# Patient Record
Sex: FEMALE | Race: WHITE | NOT HISPANIC OR LATINO | Employment: FULL TIME | URBAN - METROPOLITAN AREA
[De-identification: names, ages, dates, MRNs, and addresses within clinical notes are randomized per-mention and may not be internally consistent; named-entity substitution may affect disease eponyms.]

---

## 2018-10-22 ENCOUNTER — OFFICE VISIT CONVERTED (OUTPATIENT)
Dept: ORTHOPEDIC SURGERY | Facility: CLINIC | Age: 38
End: 2018-10-22
Attending: PHYSICIAN ASSISTANT

## 2019-02-04 ENCOUNTER — HOSPITAL ENCOUNTER (OUTPATIENT)
Dept: GENERAL RADIOLOGY | Facility: HOSPITAL | Age: 39
Discharge: HOME | End: 2019-02-04
Attending: NURSE PRACTITIONER

## 2019-02-11 ENCOUNTER — HOSPITAL ENCOUNTER (OUTPATIENT)
Dept: OTHER | Facility: HOSPITAL | Age: 39
Discharge: HOME | End: 2019-02-11
Attending: OBSTETRICS & GYNECOLOGY

## 2019-03-01 ENCOUNTER — HOSPITAL ENCOUNTER (OUTPATIENT)
Dept: PERIOP | Facility: HOSPITAL | Age: 39
Setting detail: HOSPITAL OUTPATIENT SURGERY
Discharge: HOME | End: 2019-03-01
Attending: OBSTETRICS & GYNECOLOGY

## 2019-03-01 LAB — HCG UR QL: NEGATIVE

## 2019-04-15 ENCOUNTER — CONVERSION ENCOUNTER (OUTPATIENT)
Dept: FAMILY MEDICINE CLINIC | Facility: CLINIC | Age: 39
End: 2019-04-15

## 2019-04-15 ENCOUNTER — OFFICE VISIT CONVERTED (OUTPATIENT)
Dept: FAMILY MEDICINE CLINIC | Facility: CLINIC | Age: 39
End: 2019-04-15
Attending: NURSE PRACTITIONER

## 2019-07-15 ENCOUNTER — OFFICE VISIT CONVERTED (OUTPATIENT)
Dept: FAMILY MEDICINE CLINIC | Facility: CLINIC | Age: 39
End: 2019-07-15
Attending: NURSE PRACTITIONER

## 2019-07-15 ENCOUNTER — HOSPITAL ENCOUNTER (OUTPATIENT)
Dept: GENERAL RADIOLOGY | Facility: HOSPITAL | Age: 39
Discharge: HOME | End: 2019-07-15
Attending: NURSE PRACTITIONER

## 2019-07-15 ENCOUNTER — HOSPITAL ENCOUNTER (OUTPATIENT)
Dept: LAB | Facility: HOSPITAL | Age: 39
Discharge: HOME | End: 2019-07-15

## 2019-07-15 LAB
ALBUMIN SERPL-MCNC: 4 G/DL (ref 3.5–5)
ALBUMIN/GLOB SERPL: 1.2 {RATIO} (ref 1.4–2.6)
ALP SERPL-CCNC: 160 U/L (ref 42–98)
ALT SERPL-CCNC: 21 U/L (ref 10–40)
ANION GAP SERPL CALC-SCNC: 21 MMOL/L (ref 8–19)
AST SERPL-CCNC: 20 U/L (ref 15–50)
BASOPHILS # BLD AUTO: 0.04 10*3/UL (ref 0–0.2)
BASOPHILS NFR BLD AUTO: 0.4 % (ref 0–3)
BILIRUB SERPL-MCNC: 0.2 MG/DL (ref 0.2–1.3)
BUN SERPL-MCNC: 10 MG/DL (ref 5–25)
BUN/CREAT SERPL: 13 {RATIO} (ref 6–20)
CALCIUM SERPL-MCNC: 9 MG/DL (ref 8.7–10.4)
CHLORIDE SERPL-SCNC: 103 MMOL/L (ref 99–111)
CHOLEST SERPL-MCNC: 162 MG/DL (ref 107–200)
CHOLEST/HDLC SERPL: 3.5 {RATIO} (ref 3–6)
CONV ABS IMM GRAN: 0.04 10*3/UL (ref 0–0.2)
CONV CO2: 20 MMOL/L (ref 22–32)
CONV IMMATURE GRAN: 0.4 % (ref 0–1.8)
CONV TOTAL PROTEIN: 7.3 G/DL (ref 6.3–8.2)
CREAT UR-MCNC: 0.76 MG/DL (ref 0.5–0.9)
DEPRECATED RDW RBC AUTO: 49.7 FL (ref 36.4–46.3)
EOSINOPHIL # BLD AUTO: 0.23 10*3/UL (ref 0–0.7)
EOSINOPHIL # BLD AUTO: 2.6 % (ref 0–7)
ERYTHROCYTE [DISTWIDTH] IN BLOOD BY AUTOMATED COUNT: 16.7 % (ref 11.7–14.4)
GFR SERPLBLD BASED ON 1.73 SQ M-ARVRAT: >60 ML/MIN/{1.73_M2}
GLOBULIN UR ELPH-MCNC: 3.3 G/DL (ref 2–3.5)
GLUCOSE SERPL-MCNC: 101 MG/DL (ref 65–99)
HBA1C MFR BLD: 12.2 G/DL (ref 12–16)
HCT VFR BLD AUTO: 39.9 % (ref 37–47)
HDLC SERPL-MCNC: 46 MG/DL (ref 40–60)
LDLC SERPL CALC-MCNC: 97 MG/DL (ref 70–100)
LYMPHOCYTES # BLD AUTO: 1.5 10*3/UL (ref 1–5)
MCH RBC QN AUTO: 25.1 PG (ref 27–31)
MCHC RBC AUTO-ENTMCNC: 30.6 G/DL (ref 33–37)
MCV RBC AUTO: 81.9 FL (ref 81–99)
MONOCYTES # BLD AUTO: 0.6 10*3/UL (ref 0.2–1.2)
MONOCYTES NFR BLD AUTO: 6.7 % (ref 3–10)
NEUTROPHILS # BLD AUTO: 6.5 10*3/UL (ref 2–8)
NEUTROPHILS NFR BLD AUTO: 73.1 % (ref 30–85)
NRBC CBCN: 0 % (ref 0–0.7)
OSMOLALITY SERPL CALC.SUM OF ELEC: 287 MOSM/KG (ref 273–304)
PLATELET # BLD AUTO: 363 10*3/UL (ref 130–400)
PMV BLD AUTO: 9.7 FL (ref 9.4–12.3)
POTASSIUM SERPL-SCNC: 4.6 MMOL/L (ref 3.5–5.3)
RBC # BLD AUTO: 4.87 10*6/UL (ref 4.2–5.4)
SODIUM SERPL-SCNC: 139 MMOL/L (ref 135–147)
TRIGL SERPL-MCNC: 95 MG/DL (ref 40–150)
TSH SERPL-ACNC: 3.01 M[IU]/L (ref 0.27–4.2)
VARIANT LYMPHS NFR BLD MANUAL: 16.8 % (ref 20–45)
VLDLC SERPL-MCNC: 19 MG/DL (ref 5–37)
WBC # BLD AUTO: 8.91 10*3/UL (ref 4.8–10.8)

## 2019-07-16 LAB
EST. AVERAGE GLUCOSE BLD GHB EST-MCNC: 126 MG/DL
HBA1C MFR BLD: 6 % (ref 3.5–5.7)

## 2019-07-30 ENCOUNTER — HOSPITAL ENCOUNTER (OUTPATIENT)
Dept: LAB | Facility: HOSPITAL | Age: 39
Discharge: HOME | End: 2019-07-30
Attending: NURSE PRACTITIONER

## 2019-07-30 ENCOUNTER — CONVERSION ENCOUNTER (OUTPATIENT)
Dept: FAMILY MEDICINE CLINIC | Facility: CLINIC | Age: 39
End: 2019-07-30

## 2019-07-30 LAB — GGT SERPL-CCNC: 30 U/L (ref 7–70)

## 2019-09-09 ENCOUNTER — HOSPITAL ENCOUNTER (OUTPATIENT)
Dept: LAB | Facility: HOSPITAL | Age: 39
Discharge: HOME | End: 2019-09-09

## 2019-09-16 ENCOUNTER — HOSPITAL ENCOUNTER (OUTPATIENT)
Dept: FAMILY MEDICINE CLINIC | Facility: CLINIC | Age: 39
Discharge: HOME | End: 2019-09-16
Attending: NURSE PRACTITIONER

## 2019-09-16 ENCOUNTER — OFFICE VISIT CONVERTED (OUTPATIENT)
Dept: FAMILY MEDICINE CLINIC | Facility: CLINIC | Age: 39
End: 2019-09-16
Attending: NURSE PRACTITIONER

## 2019-09-18 LAB — BACTERIA UR CULT: NORMAL

## 2019-10-14 ENCOUNTER — OFFICE VISIT CONVERTED (OUTPATIENT)
Dept: FAMILY MEDICINE CLINIC | Facility: CLINIC | Age: 39
End: 2019-10-14
Attending: NURSE PRACTITIONER

## 2019-11-08 ENCOUNTER — OFFICE VISIT CONVERTED (OUTPATIENT)
Dept: OTOLARYNGOLOGY | Facility: CLINIC | Age: 39
End: 2019-11-08
Attending: OTOLARYNGOLOGY

## 2019-11-08 ENCOUNTER — CONVERSION ENCOUNTER (OUTPATIENT)
Dept: OTHER | Facility: HOSPITAL | Age: 39
End: 2019-11-08

## 2019-11-08 ENCOUNTER — HOSPITAL ENCOUNTER (OUTPATIENT)
Dept: OTOLARYNGOLOGY | Facility: HOSPITAL | Age: 39
Discharge: HOME | End: 2019-11-08
Attending: OTOLARYNGOLOGY

## 2020-07-24 ENCOUNTER — HOSPITAL ENCOUNTER (OUTPATIENT)
Dept: LAB | Facility: HOSPITAL | Age: 40
Discharge: HOME | End: 2020-07-24

## 2020-07-24 LAB
ALBUMIN SERPL-MCNC: 4 G/DL (ref 3.5–5)
ALBUMIN/GLOB SERPL: 1.3 {RATIO} (ref 1.4–2.6)
ALP SERPL-CCNC: 128 U/L (ref 42–98)
ALT SERPL-CCNC: 21 U/L (ref 10–40)
ANION GAP SERPL CALC-SCNC: 15 MMOL/L (ref 8–19)
AST SERPL-CCNC: 18 U/L (ref 15–50)
BASOPHILS # BLD AUTO: 0.03 10*3/UL (ref 0–0.2)
BASOPHILS NFR BLD AUTO: 0.4 % (ref 0–3)
BILIRUB SERPL-MCNC: 0.22 MG/DL (ref 0.2–1.3)
BUN SERPL-MCNC: 13 MG/DL (ref 5–25)
BUN/CREAT SERPL: 18 {RATIO} (ref 6–20)
CALCIUM SERPL-MCNC: 9.7 MG/DL (ref 8.7–10.4)
CHLORIDE SERPL-SCNC: 102 MMOL/L (ref 99–111)
CHOLEST SERPL-MCNC: 178 MG/DL (ref 107–200)
CHOLEST/HDLC SERPL: 3.7 {RATIO} (ref 3–6)
CONV ABS IMM GRAN: 0.02 10*3/UL (ref 0–0.2)
CONV CO2: 25 MMOL/L (ref 22–32)
CONV IMMATURE GRAN: 0.3 % (ref 0–1.8)
CONV TOTAL PROTEIN: 7.1 G/DL (ref 6.3–8.2)
CREAT UR-MCNC: 0.73 MG/DL (ref 0.5–0.9)
DEPRECATED RDW RBC AUTO: 45.1 FL (ref 36.4–46.3)
EOSINOPHIL # BLD AUTO: 0.3 10*3/UL (ref 0–0.7)
EOSINOPHIL # BLD AUTO: 4 % (ref 0–7)
ERYTHROCYTE [DISTWIDTH] IN BLOOD BY AUTOMATED COUNT: 13.6 % (ref 11.7–14.4)
EST. AVERAGE GLUCOSE BLD GHB EST-MCNC: 120 MG/DL
GFR SERPLBLD BASED ON 1.73 SQ M-ARVRAT: >60 ML/MIN/{1.73_M2}
GLOBULIN UR ELPH-MCNC: 3.1 G/DL (ref 2–3.5)
GLUCOSE SERPL-MCNC: 111 MG/DL (ref 65–99)
HBA1C MFR BLD: 5.8 % (ref 3.5–5.7)
HCT VFR BLD AUTO: 45.1 % (ref 37–47)
HDLC SERPL-MCNC: 48 MG/DL (ref 40–60)
HGB BLD-MCNC: 14 G/DL (ref 12–16)
LDLC SERPL CALC-MCNC: 112 MG/DL (ref 70–100)
LYMPHOCYTES # BLD AUTO: 1.61 10*3/UL (ref 1–5)
LYMPHOCYTES NFR BLD AUTO: 21.4 % (ref 20–45)
MCH RBC QN AUTO: 28.2 PG (ref 27–31)
MCHC RBC AUTO-ENTMCNC: 31 G/DL (ref 33–37)
MCV RBC AUTO: 90.7 FL (ref 81–99)
MONOCYTES # BLD AUTO: 0.46 10*3/UL (ref 0.2–1.2)
MONOCYTES NFR BLD AUTO: 6.1 % (ref 3–10)
NEUTROPHILS # BLD AUTO: 5.1 10*3/UL (ref 2–8)
NEUTROPHILS NFR BLD AUTO: 67.8 % (ref 30–85)
NRBC CBCN: 0 % (ref 0–0.7)
OSMOLALITY SERPL CALC.SUM OF ELEC: 287 MOSM/KG (ref 273–304)
PLATELET # BLD AUTO: 306 10*3/UL (ref 130–400)
PMV BLD AUTO: 9.4 FL (ref 9.4–12.3)
POTASSIUM SERPL-SCNC: 4.1 MMOL/L (ref 3.5–5.3)
RBC # BLD AUTO: 4.97 10*6/UL (ref 4.2–5.4)
SODIUM SERPL-SCNC: 138 MMOL/L (ref 135–147)
TRIGL SERPL-MCNC: 89 MG/DL (ref 40–150)
TSH SERPL-ACNC: 2.98 M[IU]/L (ref 0.27–4.2)
VLDLC SERPL-MCNC: 18 MG/DL (ref 5–37)
WBC # BLD AUTO: 7.52 10*3/UL (ref 4.8–10.8)

## 2020-07-27 ENCOUNTER — OFFICE VISIT CONVERTED (OUTPATIENT)
Dept: FAMILY MEDICINE CLINIC | Facility: CLINIC | Age: 40
End: 2020-07-27
Attending: NURSE PRACTITIONER

## 2020-07-27 ENCOUNTER — CONVERSION ENCOUNTER (OUTPATIENT)
Dept: FAMILY MEDICINE CLINIC | Facility: CLINIC | Age: 40
End: 2020-07-27

## 2021-04-12 ENCOUNTER — HOSPITAL ENCOUNTER (OUTPATIENT)
Dept: LAB | Facility: HOSPITAL | Age: 41
Discharge: HOME | End: 2021-04-12
Attending: NURSE PRACTITIONER

## 2021-04-12 ENCOUNTER — OFFICE VISIT CONVERTED (OUTPATIENT)
Dept: FAMILY MEDICINE CLINIC | Facility: CLINIC | Age: 41
End: 2021-04-12
Attending: NURSE PRACTITIONER

## 2021-04-12 LAB
BASOPHILS # BLD AUTO: 0.04 10*3/UL (ref 0–0.2)
BASOPHILS NFR BLD AUTO: 0.4 % (ref 0–3)
CONV ABS IMM GRAN: 0.03 10*3/UL (ref 0–0.2)
CONV IMMATURE GRAN: 0.3 % (ref 0–1.8)
DEPRECATED RDW RBC AUTO: 45.1 FL (ref 36.4–46.3)
EOSINOPHIL # BLD AUTO: 0.36 10*3/UL (ref 0–0.7)
EOSINOPHIL # BLD AUTO: 3.7 % (ref 0–7)
ERYTHROCYTE [DISTWIDTH] IN BLOOD BY AUTOMATED COUNT: 13.9 % (ref 11.7–14.4)
HCT VFR BLD AUTO: 41.6 % (ref 37–47)
HGB BLD-MCNC: 13.4 G/DL (ref 12–16)
LYMPHOCYTES # BLD AUTO: 1.94 10*3/UL (ref 1–5)
LYMPHOCYTES NFR BLD AUTO: 20.1 % (ref 20–45)
MCH RBC QN AUTO: 28.5 PG (ref 27–31)
MCHC RBC AUTO-ENTMCNC: 32.2 G/DL (ref 33–37)
MCV RBC AUTO: 88.5 FL (ref 81–99)
MONOCYTES # BLD AUTO: 0.7 10*3/UL (ref 0.2–1.2)
MONOCYTES NFR BLD AUTO: 7.3 % (ref 3–10)
NEUTROPHILS # BLD AUTO: 6.58 10*3/UL (ref 2–8)
NEUTROPHILS NFR BLD AUTO: 68.2 % (ref 30–85)
NRBC CBCN: 0 % (ref 0–0.7)
PLATELET # BLD AUTO: 323 10*3/UL (ref 130–400)
PMV BLD AUTO: 9.8 FL (ref 9.4–12.3)
RBC # BLD AUTO: 4.7 10*6/UL (ref 4.2–5.4)
WBC # BLD AUTO: 9.65 10*3/UL (ref 4.8–10.8)

## 2021-04-13 LAB
ALBUMIN SERPL-MCNC: 3.8 G/DL (ref 3.5–5)
ALBUMIN/GLOB SERPL: 1.2 {RATIO} (ref 1.4–2.6)
ALP SERPL-CCNC: 121 U/L (ref 42–98)
ALT SERPL-CCNC: 20 U/L (ref 10–40)
ANION GAP SERPL CALC-SCNC: 15 MMOL/L (ref 8–19)
ASO AB SERPL-ACNC: 76 [IU]/ML (ref 0–200)
AST SERPL-CCNC: 19 U/L (ref 15–50)
BILIRUB SERPL-MCNC: <0.15 MG/DL (ref 0.2–1.3)
BUN SERPL-MCNC: 8 MG/DL (ref 5–25)
BUN/CREAT SERPL: 12 {RATIO} (ref 6–20)
CALCIUM SERPL-MCNC: 8.6 MG/DL (ref 8.7–10.4)
CHLORIDE SERPL-SCNC: 104 MMOL/L (ref 99–111)
CONV CO2: 22 MMOL/L (ref 22–32)
CONV RHEUMATOID FACTOR IGM: <10 [IU]/ML (ref 0–14)
CONV TOTAL PROTEIN: 7 G/DL (ref 6.3–8.2)
CREAT UR-MCNC: 0.67 MG/DL (ref 0.5–0.9)
CRP SERPL-MCNC: 12.1 MG/L (ref 0–5)
DSDNA AB SER-ACNC: NEGATIVE [IU]/ML
ENA AB SER IA-ACNC: NEGATIVE {RATIO}
ERYTHROCYTE [SEDIMENTATION RATE] IN BLOOD: 20 MM/H (ref 0–20)
GFR SERPLBLD BASED ON 1.73 SQ M-ARVRAT: >60 ML/MIN/{1.73_M2}
GLOBULIN UR ELPH-MCNC: 3.2 G/DL (ref 2–3.5)
GLUCOSE SERPL-MCNC: 90 MG/DL (ref 65–99)
OSMOLALITY SERPL CALC.SUM OF ELEC: 282 MOSM/KG (ref 273–304)
PHOSPHATE SERPL-MCNC: 3.9 MG/DL (ref 2.4–4.5)
POTASSIUM SERPL-SCNC: 4.2 MMOL/L (ref 3.5–5.3)
SODIUM SERPL-SCNC: 137 MMOL/L (ref 135–147)
URATE SERPL-MCNC: 4 MG/DL (ref 2.5–7.5)

## 2021-04-14 ENCOUNTER — OFFICE VISIT CONVERTED (OUTPATIENT)
Dept: CARDIOLOGY | Facility: CLINIC | Age: 41
End: 2021-04-14
Attending: INTERNAL MEDICINE

## 2021-04-22 ENCOUNTER — HOSPITAL ENCOUNTER (OUTPATIENT)
Dept: CARDIOLOGY | Facility: HOSPITAL | Age: 41
Discharge: HOME | End: 2021-04-22
Attending: INTERNAL MEDICINE

## 2021-05-11 NOTE — H&P
History and Physical      Patient Name: Catherine Reyes   Patient ID: [id number]   Sex: Female   YOB: 1980    Primary Care Provider: Ascencion SANCHEZ    Visit Date: April 14, 2021    Provider: Damion Navarro MD   Location: [facility]   Location Address: [address] [place] [address]  [address]  [place], [place]  [id number]   Location Phone: [phone]          Chief Complaint     Generalized fatigue.       History Of Present Illness  Consult requested by: Ascencion SANCHEZ   Catherine Reyes is a 40 year old /White female who was referred here by Ascencion Tay due to mild generalized fatigue and a strong family history of premature coronary artery disease. She does not report any episode of chest pain/pressure but does have mild chronic exertional dyspnea, which has been stable over the past year. She is attempting to lose some weight and is interested in being started on medication to help with that goal. Ms. Reyes is a former smoker and previously smoked 1 to 1-1/2 packs of cigarettes daily for approximately 25 years before quitting smoking about 2 years ago. She does not report any history of known hypertension, hyperlipidemia, or diabetes mellitus. However, she does have a strong family history of premature coronary artery disease and her brother had a heart attack at age 48 last year. She is significantly overweight with a BMI of 48.3 today. Ms. Reyes does not report any episodes of palpitations, orthopnea, PND, lightheadedness, or syncope. She has chronic very mild bilateral lower extremity edema, which is stable and unchanged. She does not report any reduction in her exercise tolerance and she remains moderately physically active. She has experienced some bluish discoloration of her feet, particularly after walking on them for most of the day. Her PCP has ordered an ROSALIA test to assess for arterial insufficiency, which has not yet been completed. Ms. Reyes does not report any  symptoms of intermittent claudication or any lower extremity wounds/ulcers. She has never been tested for venous insufficiency.   PAST MEDICAL HISTORY: Significant for nephrolithiasis, chronic left knee pain, migraine headaches, and mild bilateral ankle swelling. Past surgical history includes  section, cholecystectomy, and ablation.   PSYCHOSOCIAL HISTORY: She previously smoked 1 to 1-1/2 packs of cigarettes daily for approximately 25 years but quit smoking 2-3 years ago. She does not report any history of alcohol abuse or illicit drug use. She is  and lives with her .   FAMILY HISTORY: Significant for premature coronary artery disease in her brother and a grandfather. Also positive for hypertension in multiple family members and diabetes.   CURRENT MEDICATIONS: None.   ALLERGIES: Codeine.       Review of Systems  · Constitutional  o Admits  o : fatigue, good general health lately  o Denies  o : recent weight changes   · Eyes  o Admits  o : double vision  · HENT  o Admits  o : Sinus congestion and rhinorrhea  o Denies  o : hearing loss or ringing, swollen glands in neck  · Cardiovascular  o Admits  o : swelling (feet, ankles, hands)  o Denies  o : chest pain, palpitations (fast, fluttering, or skipping beats), shortness of breath while walking or lying flat  · Respiratory  o Admits  o : Occasional nonproductive cough  o Denies  o : asthma or wheezing, COPD  · Gastrointestinal  o Denies  o : ulcers, nausea or vomiting  · Neurologic  o Admits  o : headaches  o Denies  o : lightheaded or dizzy, stroke  · Musculoskeletal  o Admits  o : joint pain, back pain  · Endocrine  o Denies  o : thyroid disease, diabetes, heat or cold intolerance, excessive thirst or urination  · Heme-Lymph  o Admits  o : bleeding or bruising tendency  o Denies  o : anemia      Vitals  Date Time BP Position Site L\R Cuff Size HR RR TEMP (F) WT  HT  BMI kg/m2 BSA m2 O2 Sat FR L/min FiO2 HC       2021 11:38 /80  "Sitting    66 - R   299lbs 0oz 5'  6\" 48.26 2.51             Physical Examination  · Constitutional  o Appearance  o : Well developed, well nourished, alert and oriented, in no acute distress, appears stated age.  · Head and Face  o HEENT  o : Atraumatic. Pupils equal, round and reactive to light. No scleral icterus. Normal conjunctivae. Mucous membranes are moist.   · Neck  o Inspection/Palpation  o : Supple, no masses or thyromegaly.  o Jugular Veins  o : No JVD. No carotid bruits.  · Respiratory  o Auscultation of Lungs  o : Lungs clear to auscultation bilaterally. No wheezing, rales, or rhonchi. Mildly prolonged expiratory phase. No tachypnea. Normal effort with no increased work of breathing. No dullness.   · Cardiovascular  o Heart  o : Regular rate and rhythm, normal S1 and S2, no S3 or S4 gallop appreciated, no murmur, no heave.   · Gastrointestinal  o Abdominal Examination  o : Soft, obese, nondistended, nontender, normoactive bowel sounds in all quadrants, no masses, no hepatomegaly.   · Musculoskeletal  o General  o : Normal muscle tone and strength.  · Skin and Subcutaneous Tissue  o General Inspection  o : Warm and dry, no rashes or lesions, no jaundice, normal skin turgor.   · Neurologic  o Neurologic/Reflexes  o : Normal speech. Cranial nerves II-XII grossly intact. No focal motor deficits.   · Extremities  o Extremities  o : No cyanosis or clubbing. Trace bilateral lower extremity edema. There are 2+ radial pulses bilaterally.   · EKG  o EKG  o : Performed in the office today.   o Indications  o : Fatigue.  o Results  o : Sinus rhythm with heart rate of 63 beats per minute, normal EKG.   o Comparison  o : No prior EKG available for comparison.   · Labs  o Labs  o : Labs done on 04/12/2021: CBC: White blood cells 9.65, hemoglobin 13.4, hematocrit 41.6, platelets 323. Chemistry: Sodium 137, potassium 4.2, chloride 104, bicarb 22, BUN 8, creatinine 0.67, glucose 90. Liver function tests were within " normal limits. Fasting lipid panel from 07/24/2020: Total cholesterol 178, triglycerides 89, HDL 48, .           Assessment     1.  Mild generalized fatigue and mild chronic exertional dyspnea, likely multifactorial in nature.  However, she        does have several cardiovascular risk factors and would benefit from cardiovascular risk stratification.   2.  Morbid obesity with a BMI of 48.3 today.    3.  Former longstanding tobacco abuse.   4.  Intermittent bilateral foot discoloration, as detailed above.  Her PCP has already ordered an ROSALIA and I will        await the results of that study before proceeding with further workup.   5.  Borderline hypertension per today's BP reading.  She does not report any previous history of hypertension.          Plan     1.  Will obtain a treadmill stress test for cardiovascular risk stratification and further evaluation of her relatively        nonspecific symptoms.    2.  Will also obtain an echocardiogram to rule out structural heart disease.   3.  Her LDL goal would be less than 100 given her 10-year Seville risk and her last fasting lipid panel in        2020 showed a LDL of 112.  I recommended some dietary changes to improve her lipid status as well as a        repeat fasting lipid profile sometime in the next couple months.  If her LDL level remains significantly above        100, we could consider statin therapy but will defer this for now.    4.  She may benefit from further pulmonary evaluation if her cardiac testing is unremarkable.    5.  Significant weight loss was strongly recommended and we reviewed some dietary and exercise strategies        to help assist with this goal.  She previously tried Saxenda for weight loss but had trouble tolerating the        medication due to recurrent nausea.   6.  I will plan to see her back in the office in 2 months once we have the results of the aforementioned tests.     Damion Navarro MD  BP/rt                 Electronically Signed by: Kay Scott-, Other -Author on April 20, 2021 03:32:24 PM  Electronically Co-signed by: Damion Navarro MD -Reviewer on May 5, 2021 10:02:30 AM

## 2021-05-13 NOTE — PROGRESS NOTES
Progress Note      Patient Name: Catherine Reyes   Patient ID: [id number]   Sex: Female   YOB: 1980    Primary Care Provider: Ascencion SANCHEZ    Visit Date: 2020    Provider: DANIEL Metzger   Location: [facility]   Location Address: [address] [place] [address], [address] [place] [address]  [id number]   Location Phone: [phone]          Chief Complaint     [facility]       History Of Present Illness  Catherine Reyes is a 40 year old /White female who presents for evaluation and treatment of:      Here for [facility] physical.  Doing well.  Reviewed labs with patient.             Past Medical History  Disease Name Date Onset Notes   Ankle swelling --  --    Kidney stones --  --    Left knee pain --  --    Migraines --  --          Past Surgical History  Procedure Name Date Notes   Ablation --  --     --  --    Cholecystecomy --  --          Allergy List  Allergen Name Date Reaction Notes   Codeine Phosphate --  --  --    Codeine Sulfate --  --  --          Family Medical History  Disease Name Relative/Age Notes   Heart Disease  --    Hypertension  --    Diabetes  --          Social History  Finding Status Start/Stop Quantity Notes   Alcohol Never --/-- --  --    Tobacco Former --/-- < 0.5 ppd 15 years         Immunizations  NameDate Admin Mfg Trade Name Lot Number Route Inj VIS Given VIS Publication   [id number]/2019 SKB Fluarix, quadrivalent, preservative free 2A2KX NE NE 2020    Comments:    Gylfdozac48/2018 SKB Fluarix, quadrivalent, preservative free 2A2KX NE NE 07/15/2019    Comments:          Review of Systems  · Constitutional  o Denies  o : fatigue, night sweats  · Eyes  o Denies  o : double vision, blurred vision  · HENT  o Denies  o : vertigo, recent head injury  · Breasts  o Denies  o : abnormal changes in breast size, additional breast symptoms except as noted in the HPI  · Cardiovascular  o Denies  o : chest pain, irregular heart  "beats  · Respiratory  o Denies  o : shortness of breath, productive cough  · Gastrointestinal  o Denies  o : nausea, vomiting  · Genitourinary  o Denies  o : dysuria, urinary retention  · Integument  o Denies  o : hair growth change, new skin lesions  · Neurologic  o Denies  o : altered mental status, seizures  · Musculoskeletal  o Denies  o : joint swelling, limitation of motion  · Endocrine  o Denies  o : cold intolerance, heat intolerance  · Heme-Lymph  o Denies  o : petechiae, lymph node enlargement or tenderness  · Allergic-Immunologic  o Denies  o : frequent illnesses      Vitals  Date Time BP Position Site L\R Cuff Size HR RR TEMP (F) WT  HT  BMI kg/m2 BSA m2 O2 Sat HC       07/27/2020 03:03 /82 Sitting    84 - R 18 98.7 288lbs 0oz 5'  6\" 46.48 2.47 98 %          Physical Examination  · Constitutional  o Appearance  o : well-nourished, well developed, alert, in no acute distress  · Eyes  o Conjunctivae  o : conjunctivae normal  o Sclerae  o : sclerae white  o Pupils and Irises  o : pupils equal, round, and reactive to light and accommodation bilaterally  o Corneas  o : tear film normal, no lesions present  o Eyelids/Ocular Adnexae  o : eyelid appearance normal, no exudates present, eye moisture level normal  · Respiratory  o Respiratory Effort  o : breathing unlabored  o Inspection of Chest  o : normal appearance, no retractions  o Auscultation of Lungs  o : normal breath sounds throughout  · Cardiovascular  o Heart  o :   § Auscultation of Heart  § : regular rate and rhythm without murmur, PMI normal  o Peripheral Vascular System  o :   § Extremities  § : no cyanosis, clubbing or edema; less than 2 second refill noted  · Musculoskeletal  o General  o : No joint swelling or deformity noted. Muscle tone, strength and development grossly normal.  · Skin and Subcutaneous Tissue  o General Inspection  o : no rashes or lesions present, no areas of discoloration  · Neurologic  o Mental Status Examination  o : " judgement, insight intact, modd and affect appropriate  o Motor Examination  o : strength grossly intact in all four extremities  o Gait and Station  o : normal gait, able to stand without difficulty          Assessment  · Annual physical exam     V70.0/Z00.00    Problems Reconciled  Plan  · Orders  o ACO-39: Current medications updated and reviewed () - - 07/27/2020  o ACO-14: Influenza immunization administered or previously received () - - 07/27/2020  · Medications  o phentermine 37.5 mg oral tablet   SIG: take 1 tablet (37.5 mg) by oral route once daily before breakfast for 30 days   DISP: (30) tablets with 0 refills  Discontinued on 07/27/2020     o Medications have been Reconciled  o Transition of Care or Provider Policy  · Instructions  o Reviewed health maintenance flowsheet and updated information. Orders were placed and/or patient's response was documented.  o Patient was educated/instructed on their diagnosis, treatment and medications prior to discharge from the clinic today.  o Minutes spent with patient including greater than 50% in Education/Counseling/Care Coordination.  o Time spent with the patient was minutes, more than 50% face to face.  · Disposition  o Return as needed            Electronically Signed by: DANIEL Metzger -Author on July 27, 2020 03:14:37 PM

## 2021-05-14 VITALS
DIASTOLIC BLOOD PRESSURE: 84 MMHG | HEIGHT: 66 IN | WEIGHT: 293 LBS | BODY MASS INDEX: 47.09 KG/M2 | RESPIRATION RATE: 18 BRPM | HEART RATE: 83 BPM | SYSTOLIC BLOOD PRESSURE: 132 MMHG | TEMPERATURE: 97.8 F | OXYGEN SATURATION: 97 %

## 2021-05-14 VITALS
DIASTOLIC BLOOD PRESSURE: 80 MMHG | WEIGHT: 293 LBS | HEART RATE: 66 BPM | HEIGHT: 66 IN | SYSTOLIC BLOOD PRESSURE: 138 MMHG | BODY MASS INDEX: 47.09 KG/M2

## 2021-05-14 NOTE — PROGRESS NOTES
Progress Note      Patient Name: Catherine Reyes   Patient ID: [id number]   Sex: Female   YOB: 1980    Primary Care Provider: Ascencion SANCHEZ    Visit Date: 2021    Provider: DANIEL Metzger   Location: [facility]   Location Address: [address] [place] [address], [address] [place] [address]  [id number]   Location Phone: [phone]          Chief Complaint     The patient is here for joint pain and weight gain.       History Of Present Illness  Catherine Reyes is a 40 year old /White female who presents for evaluation and treatment of:      Pt c/o pain in her knees, ankles, and shoulders intermittently for a couple of months. She will have constant pain in a joint and then it will resolve spontaneously. She is concerned that this is related to her weight. She would like to start medication for weight loss. She has taken phentermine in the past with no problems but states when she stops taking it her energy crashes. She has also tried Belviq and Saxenda but had nausea when she took Saxenda so she stopped it.     She also c/o her feet turning a purple/blue color at the end of the day, especially after being on her feet all day. She denies SOA or chest pain. She also has had some numbness in her hands.       Past Medical History  Disease Name Date Onset Notes   Ankle swelling --  --    Kidney Stones --  --    Left knee pain --  --    Migraines --  --          Past Surgical History  Procedure Name Date Notes   Ablation --  --     --  --    Cholecystecomy --  --          Allergy List  Allergen Name Date Reaction Notes   Codeine Phosphate --  --  --    Codeine Sulfate --  --  --        Allergies Reconciled  Family Medical History  Disease Name Relative/Age Notes   Heart Disease  --    Hypertension  --    Diabetes  --          Social History  Finding Status Start/Stop Quantity Notes   Alcohol Never --/-- --  --    Tobacco Former --/-- < 0.5 ppd 15 years  "        Immunizations  NameDate Admin Mfg Trade Name Lot Number Route Inj VIS Given VIS Publication   COVID Julio César&John04/09/2021 NE Not Entered  NE NE 04/12/2021    Comments:    Veehkfjtx61/01/2020 SKB Fluarix, quadrivalent, preservative free 2A2KX NE NE 04/12/2021    Comments:          Review of Systems  · Constitutional  o Admits  o : weight gain  o Denies  o : fever, fatigue, weight loss  · Cardiovascular  o Denies  o : lower extremity edema, claudication, chest pressure, palpitations  · Respiratory  o Denies  o : shortness of breath, wheezing, cough, hemoptysis, dyspnea on exertion  · Gastrointestinal  o Denies  o : nausea, vomiting, diarrhea, constipation, abdominal pain  · Musculoskeletal  o Denies  o : joint pain      Vitals  Date Time BP Position Site L\R Cuff Size HR RR TEMP (F) WT  HT  BMI kg/m2 BSA m2 O2 Sat FR L/min FiO2        04/12/2021 02:45 /84 Sitting    83 - R 18 97.8 301lbs 0oz 5'  6\" 48.58 2.52 97 %  21%          Physical Examination  · Constitutional  o Appearance  o : well-nourished, well developed, alert, in no acute distress  · Eyes  o Conjunctivae  o : conjunctivae normal  o Sclerae  o : sclerae white  o Pupils and Irises  o : pupils equal, round, and reactive to light and accommodation bilaterally  o Corneas  o : tear film normal, no lesions present  o Eyelids/Ocular Adnexae  o : eyelid appearance normal, no exudates present, eye moisture level normal  · Ears, Nose, Mouth and Throat  o Throat  o : no erythemia, exudate or lesions  · Neck  o Inspection/Palpation  o : normal appearance, no masses or tenderness, trachea midline, no enlarged cervical or supraclavicular lymphnodes palpated  o Thyroid  o : gland size normal, nontender, no nodules or masses present on palpation  · Respiratory  o Respiratory Effort  o : breathing unlabored  o Inspection of Chest  o : normal appearance, no retractions  o Auscultation of Lungs  o : normal breath sounds " throughout  · Cardiovascular  o Heart  o :   § Auscultation of Heart  § : regular rate and rhythm without murmur, PMI normal  o Peripheral Vascular System  o :   § Carotid Arteries  § : normal pulses bilaterally, no bruits present  § Pedal Pulses  § : pulses 2 bilaterally  § Extremities  § : no cyanosis, clubbing or edema; less than 2 second refill noted  · Musculoskeletal  o General  o : No joint swelling or deformity noted. Muscle tone, strength and development grossly normal.  · Skin and Subcutaneous Tissue  o General Inspection  o : no rashes or lesions present, no areas of discoloration  · Neurologic  o Mental Status Examination  o : judgement, insight intact, modd and affect appropriate  o Motor Examination  o : strength grossly intact in all four extremities  o Gait and Station  o : normal gait, able to stand without difficulty          Assessment  · Screening for depression     V79.0/Z13.89  · Visit for screening mammogram     V76.12/Z12.31  · Obesity     278.00/E66.9  Will give a sample of Saxenda today.  · Polyarthralgia     719.49/M25.50  · BMI 45.0-49.9, adult     V85.42/Z68.42  · [place]     782.62/R23.2  · Joint pain     719.40/M25.50  · Family history of early CAD     V17.3/Z82.49  With dad and her pain with walking in her feet turning purple working to hold on doing any phentermine until she gets cardiovascular cleared. But with her brother having early heart attack before the age of 50 she does needs a baseline work-up.  · Intermittent claudication     443.9/I73.9      Plan  · Orders  o Annual depression screening, 15 minutes (00272, ) - V79.0/Z13.89 - 04/12/2021  o ACO-18: Negative screen for clinical depression using a standardized tool () - V79.0/Z13.89 - 04/12/2021  o RA Profile 2 (Ca, Phos, Alk Phos, CRP, ESR, Uric Acid, ASO, RF IgM, KATHARINE) [facility] (15040, 51883, 51641, 22089, 76408, 31815, 71881, 27413, 05730) - 719.49/M25.50 - 04/12/2021  o CBC with Auto Diff [facility] (45026) - 278.00/E66.9,  V17.3/Z82.49 - 04/12/2021  o CMP [facility] (46587) - 278.00/E66.9, 782.62/R23.2, V17.3/Z82.49 - 04/12/2021  o ACO-14: Influenza immunization administered or previously received [facility] () - - 04/12/2021  o ACO-39: Current medications updated and reviewed (1159F, ) - - 04/12/2021  o CARDIOLOGY CONSULTATION (CARDI) - V17.3/Z82.49 - 04/12/2021  o AELE Bilateral (Complete) (67299, 59786, 28756) - 443.9/I73.9 - 04/12/2021  · Medications  o Saxenda 3 mg/0.5 mL (18 mg/3 mL) subcutaneous pen injector   SIG: inject 3 mg by subcutaneous route once daily in the abdomen, thigh, or upper arm for 30 days   DISP: (1) Pen Needle with 0 refills  Prescribed on 04/12/2021     o Medications have been Reconciled  o Transition of Care or Provider Policy  · Instructions  o Depression Screen completed and scanned into the EMR under the designated folder within the patient's documents.  o Today's PHQ-9 result is __3_  o The provider screening met the required time of 15 minutes.  o Patient declines breast cancer screening. Discussed risks associated with not having screening performed.   o Patient was educated/instructed on their diagnosis, treatment and medications prior to discharge from the clinic today.  o Minutes spent with patient including greater than 50% in Education/Counseling/Care Coordination.  o Time spent with the patient was minutes, more than 50% face to face.            Electronically Signed by: DANIEL Metzger -Author on April 12, 2021 03:44:08 PM

## 2021-05-15 VITALS
SYSTOLIC BLOOD PRESSURE: 130 MMHG | OXYGEN SATURATION: 100 % | RESPIRATION RATE: 18 BRPM | DIASTOLIC BLOOD PRESSURE: 78 MMHG | HEIGHT: 66 IN | WEIGHT: 289 LBS | HEART RATE: 87 BPM | BODY MASS INDEX: 46.45 KG/M2 | TEMPERATURE: 97.7 F

## 2021-05-15 VITALS
SYSTOLIC BLOOD PRESSURE: 138 MMHG | RESPIRATION RATE: 16 BRPM | DIASTOLIC BLOOD PRESSURE: 70 MMHG | TEMPERATURE: 98.4 F | HEART RATE: 79 BPM | OXYGEN SATURATION: 96 %

## 2021-05-15 VITALS
HEART RATE: 85 BPM | TEMPERATURE: 98.1 F | HEIGHT: 66 IN | OXYGEN SATURATION: 98 % | SYSTOLIC BLOOD PRESSURE: 132 MMHG | BODY MASS INDEX: 47.09 KG/M2 | DIASTOLIC BLOOD PRESSURE: 70 MMHG | WEIGHT: 293 LBS | RESPIRATION RATE: 18 BRPM

## 2021-05-15 VITALS
SYSTOLIC BLOOD PRESSURE: 132 MMHG | RESPIRATION RATE: 16 BRPM | OXYGEN SATURATION: 99 % | HEART RATE: 92 BPM | TEMPERATURE: 97.9 F | HEIGHT: 66 IN | BODY MASS INDEX: 45.64 KG/M2 | WEIGHT: 284 LBS | DIASTOLIC BLOOD PRESSURE: 89 MMHG

## 2021-05-15 VITALS
HEIGHT: 66 IN | HEART RATE: 84 BPM | SYSTOLIC BLOOD PRESSURE: 132 MMHG | TEMPERATURE: 98.7 F | DIASTOLIC BLOOD PRESSURE: 82 MMHG | BODY MASS INDEX: 46.28 KG/M2 | OXYGEN SATURATION: 98 % | RESPIRATION RATE: 18 BRPM | WEIGHT: 288 LBS

## 2021-05-15 VITALS
OXYGEN SATURATION: 98 % | DIASTOLIC BLOOD PRESSURE: 82 MMHG | HEART RATE: 75 BPM | HEIGHT: 66 IN | SYSTOLIC BLOOD PRESSURE: 146 MMHG | RESPIRATION RATE: 18 BRPM | WEIGHT: 293 LBS | TEMPERATURE: 97.9 F | BODY MASS INDEX: 47.09 KG/M2

## 2021-05-15 VITALS — WEIGHT: 293 LBS

## 2021-05-16 VITALS — BODY MASS INDEX: 46.65 KG/M2 | WEIGHT: 280 LBS | HEIGHT: 65 IN | RESPIRATION RATE: 16 BRPM

## 2021-07-26 ENCOUNTER — TRANSCRIBE ORDERS (OUTPATIENT)
Dept: ADMINISTRATIVE | Facility: HOSPITAL | Age: 41
End: 2021-07-26

## 2021-07-26 ENCOUNTER — LAB (OUTPATIENT)
Dept: LAB | Facility: HOSPITAL | Age: 41
End: 2021-07-26

## 2021-07-26 DIAGNOSIS — Z13.9 ENCOUNTER FOR HEALTH-RELATED SCREENING: Primary | ICD-10-CM

## 2021-07-26 DIAGNOSIS — Z13.9 ENCOUNTER FOR HEALTH-RELATED SCREENING: ICD-10-CM

## 2021-07-26 LAB
ALBUMIN SERPL-MCNC: 4.1 G/DL (ref 3.5–5.2)
ALBUMIN/GLOB SERPL: 1.3 G/DL
ALP SERPL-CCNC: 142 U/L (ref 39–117)
ALT SERPL W P-5'-P-CCNC: 18 U/L (ref 1–33)
ANION GAP SERPL CALCULATED.3IONS-SCNC: 14.9 MMOL/L (ref 5–15)
AST SERPL-CCNC: 15 U/L (ref 1–32)
BASOPHILS # BLD AUTO: 0.05 10*3/MM3 (ref 0–0.2)
BASOPHILS NFR BLD AUTO: 0.6 % (ref 0–1.5)
BILIRUB SERPL-MCNC: 0.3 MG/DL (ref 0–1.2)
BUN SERPL-MCNC: 7 MG/DL (ref 6–20)
BUN/CREAT SERPL: 8 (ref 7–25)
CALCIUM SPEC-SCNC: 9 MG/DL (ref 8.6–10.5)
CHLORIDE SERPL-SCNC: 102 MMOL/L (ref 98–107)
CHOLEST SERPL-MCNC: 179 MG/DL (ref 0–200)
CO2 SERPL-SCNC: 21.1 MMOL/L (ref 22–29)
CREAT SERPL-MCNC: 0.88 MG/DL (ref 0.57–1)
DEPRECATED RDW RBC AUTO: 40.8 FL (ref 37–54)
EOSINOPHIL # BLD AUTO: 0.22 10*3/MM3 (ref 0–0.4)
EOSINOPHIL NFR BLD AUTO: 2.8 % (ref 0.3–6.2)
ERYTHROCYTE [DISTWIDTH] IN BLOOD BY AUTOMATED COUNT: 13.3 % (ref 12.3–15.4)
GFR SERPL CREATININE-BSD FRML MDRD: 70 ML/MIN/1.73
GLOBULIN UR ELPH-MCNC: 3.1 GM/DL
GLUCOSE SERPL-MCNC: 82 MG/DL (ref 65–99)
HBA1C MFR BLD: 5.7 % (ref 4.8–5.6)
HCT VFR BLD AUTO: 44.5 % (ref 34–46.6)
HDLC SERPL-MCNC: 44 MG/DL (ref 40–60)
HGB BLD-MCNC: 14.8 G/DL (ref 12–15.9)
IMM GRANULOCYTES # BLD AUTO: 0.03 10*3/MM3 (ref 0–0.05)
IMM GRANULOCYTES NFR BLD AUTO: 0.4 % (ref 0–0.5)
LDLC SERPL CALC-MCNC: 117 MG/DL (ref 0–100)
LDLC/HDLC SERPL: 2.63 {RATIO}
LYMPHOCYTES # BLD AUTO: 1.61 10*3/MM3 (ref 0.7–3.1)
LYMPHOCYTES NFR BLD AUTO: 20.3 % (ref 19.6–45.3)
MCH RBC QN AUTO: 28.4 PG (ref 26.6–33)
MCHC RBC AUTO-ENTMCNC: 33.3 G/DL (ref 31.5–35.7)
MCV RBC AUTO: 85.2 FL (ref 79–97)
MONOCYTES # BLD AUTO: 0.43 10*3/MM3 (ref 0.1–0.9)
MONOCYTES NFR BLD AUTO: 5.4 % (ref 5–12)
NEUTROPHILS NFR BLD AUTO: 5.58 10*3/MM3 (ref 1.7–7)
NEUTROPHILS NFR BLD AUTO: 70.5 % (ref 42.7–76)
NRBC BLD AUTO-RTO: 0 /100 WBC (ref 0–0.2)
PLATELET # BLD AUTO: 354 10*3/MM3 (ref 140–450)
PMV BLD AUTO: 9.9 FL (ref 6–12)
POTASSIUM SERPL-SCNC: 4.5 MMOL/L (ref 3.5–5.2)
PROT SERPL-MCNC: 7.2 G/DL (ref 6–8.5)
RBC # BLD AUTO: 5.22 10*6/MM3 (ref 3.77–5.28)
SODIUM SERPL-SCNC: 138 MMOL/L (ref 136–145)
TRIGL SERPL-MCNC: 96 MG/DL (ref 0–150)
TSH SERPL DL<=0.05 MIU/L-ACNC: 2.57 UIU/ML (ref 0.27–4.2)
VLDLC SERPL-MCNC: 18 MG/DL (ref 5–40)
WBC # BLD AUTO: 7.92 10*3/MM3 (ref 3.4–10.8)

## 2021-07-26 PROCEDURE — 36415 COLL VENOUS BLD VENIPUNCTURE: CPT

## 2021-07-26 PROCEDURE — 83036 HEMOGLOBIN GLYCOSYLATED A1C: CPT

## 2021-07-26 PROCEDURE — 85025 COMPLETE CBC W/AUTO DIFF WBC: CPT

## 2021-07-26 PROCEDURE — 80053 COMPREHEN METABOLIC PANEL: CPT

## 2021-07-26 PROCEDURE — 80061 LIPID PANEL: CPT

## 2021-07-26 PROCEDURE — 84443 ASSAY THYROID STIM HORMONE: CPT

## 2021-09-15 ENCOUNTER — OFFICE VISIT (OUTPATIENT)
Dept: CARDIOLOGY | Facility: CLINIC | Age: 41
End: 2021-09-15

## 2021-09-15 VITALS
BODY MASS INDEX: 48.82 KG/M2 | DIASTOLIC BLOOD PRESSURE: 83 MMHG | SYSTOLIC BLOOD PRESSURE: 125 MMHG | HEIGHT: 65 IN | HEART RATE: 81 BPM | WEIGHT: 293 LBS

## 2021-09-15 DIAGNOSIS — R06.09 EXERTIONAL DYSPNEA: Primary | ICD-10-CM

## 2021-09-15 DIAGNOSIS — Z87.891 FORMER TOBACCO USE: ICD-10-CM

## 2021-09-15 DIAGNOSIS — E66.01 MORBID OBESITY WITH BMI OF 50.0-59.9, ADULT (HCC): ICD-10-CM

## 2021-09-15 DIAGNOSIS — E78.5 HYPERLIPIDEMIA LDL GOAL <100: ICD-10-CM

## 2021-09-15 PROCEDURE — 99214 OFFICE O/P EST MOD 30 MIN: CPT | Performed by: INTERNAL MEDICINE

## 2021-09-15 RX ORDER — ATORVASTATIN CALCIUM 20 MG/1
20 TABLET, FILM COATED ORAL DAILY
Qty: 90 TABLET | Refills: 3 | Status: SHIPPED | OUTPATIENT
Start: 2021-09-15

## 2021-09-15 NOTE — PROGRESS NOTES
Chief Complaint  Coronary Artery Disease and Results (Ekg and stress test.)    Subjective            Catherine Reyes presents to [facility]  History of Present Illness  Ms. Reyes presents for routine follow up and the results of her recent cardiovascular testing today.  Her treadmill stress test was negative for ischemia per electrocardiographic criteria with a calculated Duke Treadmill Score of 6 (low risk), and her echocardiogram showed normal left ventricular systolic function with an estimated ejection fraction of 60-65%, normal diastolic function, and mild mitral regurgitation, but no hemodynamically significant valvular disease.  She states her mild chronic exertional dyspnea has improved slightly over the summer, but she still develops significant shortness of breath with greater than normal physical activity.  She remains significantly overweight with a BMI of 50.3 today, but is attempting to lose some weight and has talked with her PCP about potential weight loss medications.  Ms. Reyes does not report any episodes of chest pain/pressure or any palpitations, orthopnea, PND, lightheadedness, or syncope.  Her mild chronic bilateral lower extremity edema remains stable and unchanged.  She states her edema always resolves with elevation of her legs in the evenings.  Her BP in the office today was normal at 125/83.  Her recent LDL level was slightly elevated at 117.  Given her strong family history of premature coronary artery disease, as well as her other cardiovascular risk factors (including former longstanding tobacco abuse), I recommended initiation of statin therapy for primary prevention, and she is agreeable.      Past Medical History:   Diagnosis Date   • Ankle swelling    • Kidney stones    • Left knee pain    • Migraines        Past Surgical History:   •  SECTION   • CHOLECYSTECTOMY   • LASER ABLATION    ABLATION       Social History     Tobacco Use   • Smoking  "status: Former Smoker   • Smokeless tobacco: Never Used   • Tobacco comment: < 0.5 PPD, 15 YEARS   Vaping Use   • Vaping Use: Never used   Substance Use Topics   • Alcohol use: Never   • Drug use: Never       Family History   Problem Relation Age of Onset   • Heart disease Other    • Hypertension Other    • Diabetes Other         No current outpatient medications on file prior to visit.     No current facility-administered medications on file prior to visit.       Allergies   Allergen Reactions   • Codeine Nausea And Vomiting       Review of Systems   Constitutional: Positive for fatigue. Negative for fever, unexpected weight gain and unexpected weight loss.   HENT: Negative for hearing loss, nosebleeds and sinus pressure.    Eyes: Negative for pain and visual disturbance.   Respiratory: Positive for shortness of breath. Negative for cough and wheezing.    Cardiovascular: Positive for leg swelling. Negative for chest pain and palpitations.   Gastrointestinal: Negative for abdominal pain, nausea and vomiting.   Endocrine: Negative for cold intolerance and heat intolerance.   Genitourinary: Negative for dysuria and hematuria.   Musculoskeletal: Positive for back pain. Negative for arthralgias and myalgias.   Skin: Negative for color change, pallor and rash.   Neurological: Negative for syncope, weakness, light-headedness and headache.   Hematological: Negative for adenopathy. Does not bruise/bleed easily.        Objective     /83 (BP Location: Right arm, Patient Position: Sitting)   Pulse 81   Ht 165.1 cm (65\")   Wt (!) 137 kg (302 lb)   BMI 50.26 kg/m²       Physical Exam  Constitutional:       General: She is not in acute distress.     Appearance: Normal appearance.   HENT:      Head: Atraumatic.      Mouth/Throat:      Mouth: Mucous membranes are moist.      Pharynx: Oropharynx is clear. No oropharyngeal exudate.   Eyes:      General: No scleral icterus.     Conjunctiva/sclera: Conjunctivae normal. "   Neck:      Vascular: No carotid bruit or JVD.   Cardiovascular:      Rate and Rhythm: Normal rate and regular rhythm.  No extrasystoles are present.     Chest Wall: PMI is not displaced.      Pulses: Normal pulses.           Radial pulses are 2+ on the right side and 2+ on the left side.      Heart sounds: S1 normal and S2 normal. No murmur heard.   No friction rub. No gallop. No S3 or S4 sounds.    Pulmonary:      Effort: Pulmonary effort is normal. Prolonged expiration present. No tachypnea or respiratory distress.      Breath sounds: No decreased breath sounds, wheezing, rhonchi or rales.   Chest:      Chest wall: No tenderness.   Abdominal:      General: Bowel sounds are normal. There is no distension.      Palpations: Abdomen is soft. There is no mass.      Tenderness: There is no abdominal tenderness.      Comments: Morbidly obese.   Musculoskeletal:         General: No swelling, tenderness or deformity.      Cervical back: Neck supple. No tenderness.      Right lower le+ Edema present.      Left lower le+ Edema present.   Skin:     General: Skin is warm and dry.      Coloration: Skin is not jaundiced.      Findings: No erythema or rash.      Nails: There is no clubbing.   Neurological:      General: No focal deficit present.      Mental Status: She is alert and oriented to person, place, and time.      Motor: No weakness.   Psychiatric:         Mood and Affect: Mood normal.         Behavior: Behavior normal.         Result Review :     The following data was reviewed by: Damion Navarro MD on 09/15/2021:    CMP    CMP 21    1632 1632    Glucose   82   Glucose 90     BUN 8  7   Creatinine 0.67  0.88   eGFR Non African Am   70   Sodium 137  138   Potassium 4.2  4.5   Chloride 104  102   Calcium  8.6 (A) 9.0   Albumin 3.8  4.10   Total Bilirubin <0.15 (A)  0.3   Alkaline Phosphatase  121 (A) 142 (A)   AST (SGOT) 19  15   ALT (SGPT) 20  18   (A) Abnormal value       Comments are  available for some flowsheets but are not being displayed.           CBC    CBC 4/12/21 7/26/21   WBC 9.65 7.92   RBC 4.70 5.22   Hemoglobin 13.4 14.8   Hematocrit 41.6 44.5   MCV 88.5 85.2   MCH 28.5 28.4   MCHC 32.2 (A) 33.3   RDW 13.9 13.3   Platelets 323 354   (A) Abnormal value            Lipid Panel    Lipid Panel 7/26/21   Total Cholesterol 179   Triglycerides 96   HDL Cholesterol 44   VLDL Cholesterol 18   LDL Cholesterol  117 (A)   LDL/HDL Ratio 2.63   (A) Abnormal value                 Assessment and Plan      Diagnoses and all orders for this visit:    1. Exertional dyspnea (Primary)    2. Hyperlipidemia LDL goal <100  -     atorvastatin (LIPITOR) 20 MG tablet; Take 1 tablet by mouth Daily.  Dispense: 90 tablet; Refill: 3    3. Morbid obesity with BMI of 50.0-59.9, adult (CMS/[facility])    4. Former tobacco use    -Exertional dyspnea:  Stable, likely secondary to deconditioning/obesity.  Her recent cardiovascular work-up was essentially unremarkable, as detailed above.  Significant weight loss was again recommended and dietary strategies were reviewed to assist with this goal.  She plans to discuss weight loss medications further with her PCP.    -Hyperlipidemia:  Given her long-term [place] risk, her LDL goal should at least be less than 100, and her recent LDL was 117.  Accordingly, I recommended initiation of statin therapy and she is agreeable to a trial of atorvastatin 20 mg QHS.    -Morbid obesity:  BMI stable at 50.3 today.  Aerobic exercise was recommended for least 20-30 minutes 3-4 times per week to help with weight loss and improve her overall health.    -Former tobacco use:  Continued tobacco cessation was recommended.      Follow Up     Return if symptoms worsen or fail to improve.    Patient was given instructions and counseling regarding her condition or for health maintenance advice. Please see specific information pulled into the AVS if appropriate.     Catherine Reyes  reports that she has  quit smoking. She has never used smokeless tobacco.. I have educated her on the risk of diseases from using tobacco products such as cancer, COPD and heart disease.  Continued tobacco cessation was advised.

## 2021-12-13 RX ORDER — AZITHROMYCIN 250 MG/1
TABLET, FILM COATED ORAL
Qty: 6 TABLET | Refills: 0 | Status: SHIPPED | OUTPATIENT
Start: 2021-12-13 | End: 2022-08-08

## 2022-03-26 ENCOUNTER — APPOINTMENT (OUTPATIENT)
Dept: GENERAL RADIOLOGY | Facility: HOSPITAL | Age: 42
End: 2022-03-26

## 2022-03-26 ENCOUNTER — HOSPITAL ENCOUNTER (EMERGENCY)
Facility: HOSPITAL | Age: 42
Discharge: HOME | End: 2022-03-26
Attending: EMERGENCY MEDICINE | Admitting: EMERGENCY MEDICINE

## 2022-03-26 VITALS
DIASTOLIC BLOOD PRESSURE: 78 MMHG | RESPIRATION RATE: 18 BRPM | OXYGEN SATURATION: 100 % | BODY MASS INDEX: 48.82 KG/M2 | HEIGHT: 65 IN | SYSTOLIC BLOOD PRESSURE: 147 MMHG | HEART RATE: 75 BPM | TEMPERATURE: 97.9 F | WEIGHT: 293 LBS

## 2022-03-26 DIAGNOSIS — S89.91XA KNEE INJURY, RIGHT, INITIAL ENCOUNTER: Primary | ICD-10-CM

## 2022-03-26 PROCEDURE — 73562 X-RAY EXAM OF KNEE 3: CPT

## 2022-03-26 PROCEDURE — 99283 EMERGENCY DEPT VISIT LOW MDM: CPT

## 2022-03-26 RX ORDER — HYDROCODONE BITARTRATE AND ACETAMINOPHEN 10; 325 MG/1; MG/1
1 TABLET ORAL EVERY 6 HOURS PRN
Status: DISCONTINUED | OUTPATIENT
Start: 2022-03-26 | End: 2022-03-26 | Stop reason: HOSPADM

## 2022-03-26 RX ORDER — IBUPROFEN 400 MG/1
800 TABLET ORAL ONCE
Status: COMPLETED | OUTPATIENT
Start: 2022-03-26 | End: 2022-03-26

## 2022-03-26 RX ADMIN — HYDROCODONE BITARTRATE AND ACETAMINOPHEN 1 TABLET: 10; 325 TABLET ORAL at 15:12

## 2022-03-26 RX ADMIN — IBUPROFEN 800 MG: 400 TABLET, FILM COATED ORAL at 15:10

## 2022-04-11 ENCOUNTER — OFFICE VISIT (OUTPATIENT)
Dept: ORTHOPEDIC SURGERY | Facility: CLINIC | Age: 42
End: 2022-04-11

## 2022-04-11 VITALS — HEIGHT: 65 IN | HEART RATE: 61 BPM | WEIGHT: 293 LBS | OXYGEN SATURATION: 99 % | BODY MASS INDEX: 48.82 KG/M2

## 2022-04-11 DIAGNOSIS — M17.11 OSTEOARTHRITIS OF RIGHT KNEE, UNSPECIFIED OSTEOARTHRITIS TYPE: Primary | ICD-10-CM

## 2022-04-11 DIAGNOSIS — S83.241A ACUTE MEDIAL MENISCUS TEAR OF RIGHT KNEE, INITIAL ENCOUNTER: ICD-10-CM

## 2022-04-11 PROCEDURE — 99203 OFFICE O/P NEW LOW 30 MIN: CPT | Performed by: STUDENT IN AN ORGANIZED HEALTH CARE EDUCATION/TRAINING PROGRAM

## 2022-04-11 NOTE — PROGRESS NOTES
"Chief Complaint  Pain of the Right Knee    Subjective          Catherine Reyes presents to [facility] for   History of Present Illness    The patient presents here today for evaluation of the right knee. The patient reports right knee pain for several years on and off. She reports  about two weeks ago she had a pop in her knee when walking up stairs. She went to the ER and was evaluated with x-rays and prescribed diclofenac. She reports relief from the diclofenac. She has been wearing a knee sleeve. She has no other complaints.     Allergies   Allergen Reactions   • Codeine Nausea And Vomiting        Social History     Socioeconomic History   • Marital status:    Tobacco Use   • Smoking status: Former Smoker   • Smokeless tobacco: Never Used   • Tobacco comment: < 0.5 PPD, 15 YEARS   Vaping Use   • Vaping Use: Never used   Substance and Sexual Activity   • Alcohol use: Never   • Drug use: Never   • Sexual activity: Defer        I reviewed the patient's chief complaint, history of present illness, review of systems, past medical history, surgical history, family history, social history, medications, and allergy list.     REVIEW OF SYSTEMS    Constitutional: Denies fevers, chills, weight loss  Cardiovascular: Denies chest pain, shortness of breath  Skin: Denies rashes, acute skin changes  Neurologic: Denies headache, loss of consciousness  MSK: Right knee pain      Objective   Vital Signs:   Pulse 61   Ht 165.1 cm (65\")   Wt 135 kg (297 lb)   SpO2 99%   BMI 49.42 kg/m²     Body mass index is 49.42 kg/m².    Physical Exam    General: Alert. No acute distress.   Right knee- mild effusion. Positive crepitus. ROM 0-100 degrees. Intact Extensor Mechanism. No hip pain with ROM. Neurovascularly intact. Tender to the posterior medial knee over the joint line. Stable to varus/valgus stress. Stable to anterior/posterior drawer. Negative Lachman's. Pain with no pop to medial joint line with " Lit's.     Procedures    Imaging Results (Most Recent)     None                   Assessment and Plan    Diagnoses and all orders for this visit:    1. Osteoarthritis of right knee, unspecified osteoarthritis type (Primary)    2. Acute medial meniscus tear of right knee, initial encounter        Discussed the treatment plan with the patient. I reviewed the x-rays that were previously obtained with the patient. The patient is symptomatic for a medial meniscus tear. I discussed weight-loss with the patient. Plan to continue diclofenac, refill given today. Home exercises given today.  We may consider an MRI if needed.    Call or return if symptoms worsen or patient has any concerns.       Scribed for Dre Monahan MD by Aneta Mirza  04/11/2022   12:57 EDT         Follow Up   Return in about 4 weeks (around 5/9/2022).  Patient was given instructions and counseling regarding her condition or for health maintenance advice. Please see specific information pulled into the AVS if appropriate.       I have personally performed the services described in this document as scribed by the above individual and it is both accurate and complete.     Dre Monahan MD  04/11/22  13:01 EDT

## 2022-05-16 ENCOUNTER — OFFICE VISIT (OUTPATIENT)
Dept: ORTHOPEDIC SURGERY | Facility: CLINIC | Age: 42
End: 2022-05-16

## 2022-05-16 VITALS — BODY MASS INDEX: 48.82 KG/M2 | OXYGEN SATURATION: 97 % | HEIGHT: 65 IN | WEIGHT: 293 LBS | HEART RATE: 73 BPM

## 2022-05-16 DIAGNOSIS — S83.241A ACUTE MEDIAL MENISCUS TEAR OF RIGHT KNEE, INITIAL ENCOUNTER: ICD-10-CM

## 2022-05-16 DIAGNOSIS — M17.11 OSTEOARTHRITIS OF RIGHT KNEE, UNSPECIFIED OSTEOARTHRITIS TYPE: Primary | ICD-10-CM

## 2022-05-16 PROCEDURE — 99213 OFFICE O/P EST LOW 20 MIN: CPT | Performed by: STUDENT IN AN ORGANIZED HEALTH CARE EDUCATION/TRAINING PROGRAM

## 2022-05-16 NOTE — PROGRESS NOTES
"Chief Complaint  Follow-up of the Right Knee    Subjective          Catherine Reyes presents to [facility] for   History of Present Illness    The patient presents here today for follow up evaluation of the right knee. The patient has been treating her right knee osteoarthritis and MMT conservatively. She has been taking diclofenac with relief. She has popping with ROM of the knee. She has no new pain or injury to the knee.   Allergies   Allergen Reactions   • Codeine Nausea And Vomiting        Social History     Socioeconomic History   • Marital status:    Tobacco Use   • Smoking status: Former Smoker   • Smokeless tobacco: Never Used   • Tobacco comment: < 0.5 PPD, 15 YEARS   Vaping Use   • Vaping Use: Never used   Substance and Sexual Activity   • Alcohol use: Never   • Drug use: Never   • Sexual activity: Defer        I reviewed the patient's chief complaint, history of present illness, review of systems, past medical history, surgical history, family history, social history, medications, and allergy list.     REVIEW OF SYSTEMS    Constitutional: Denies fevers, chills, weight loss  Cardiovascular: Denies chest pain, shortness of breath  Skin: Denies rashes, acute skin changes  Neurologic: Denies headache, loss of consciousness  MSK: Right knee pain      Objective   Vital Signs:   Pulse 73   Ht 165.1 cm (65\")   Wt 135 kg (297 lb)   SpO2 97%   BMI 49.42 kg/m²     Body mass index is 49.42 kg/m².    Physical Exam    General: Alert. No acute distress.   Right knee- ROM 0-120 no effusion. Mild crepitus. No joint line pain. Negative Lit's. Stable to anterior/posterior drawer. Stable to varus/valgus stress. Negative Lachman's. Negative Lit's. Sensation intact over the foot. Calf soft. Positive pulses.     Procedures    Imaging Results (Most Recent)     None                   Assessment and Plan        No results found.     Diagnoses and all orders for this visit:    1. " Osteoarthritis of right knee, unspecified osteoarthritis type (Primary)    2. Acute medial meniscus tear of right knee, initial encounter        Discussed the treatment plan with the patient.  Plan to continue conservative treatment with home exercises and diclofenac. May consider MRI if popping persist.           Call or return if worsening symptoms.    Scribed for Dre Monahan MD by Aneta Mirza  05/16/2022   11:10 EDT         Follow Up   Return if symptoms worsen or fail to improve.  Patient was given instructions and counseling regarding her condition or for health maintenance advice. Please see specific information pulled into the AVS if appropriate.       I have personally performed the services described in this document as scribed by the above individual and it is both accurate and complete.     rDe Monahan MD  05/16/22  11:12 EDT

## 2022-07-12 ENCOUNTER — TELEPHONE (OUTPATIENT)
Dept: ORTHOPEDIC SURGERY | Facility: CLINIC | Age: 42
End: 2022-07-12

## 2022-07-12 DIAGNOSIS — M17.11 OSTEOARTHRITIS OF RIGHT KNEE, UNSPECIFIED OSTEOARTHRITIS TYPE: Primary | ICD-10-CM

## 2022-07-12 RX ORDER — DICLOFENAC SODIUM 75 MG/1
75 TABLET, DELAYED RELEASE ORAL 2 TIMES DAILY
Qty: 60 TABLET | Refills: 1 | Status: SHIPPED | OUTPATIENT
Start: 2022-07-12

## 2022-07-12 NOTE — TELEPHONE ENCOUNTER
PATIENT CALLED FOR REFILL OF VOLTAREN BUT WANTS 75MG ONE DAILY INSTEAD OF 5OMG BID  [facility]

## 2022-08-08 ENCOUNTER — OFFICE VISIT (OUTPATIENT)
Dept: FAMILY MEDICINE CLINIC | Facility: CLINIC | Age: 42
End: 2022-08-08

## 2022-08-08 VITALS
OXYGEN SATURATION: 97 % | HEIGHT: 65 IN | DIASTOLIC BLOOD PRESSURE: 82 MMHG | SYSTOLIC BLOOD PRESSURE: 126 MMHG | TEMPERATURE: 97.5 F | BODY MASS INDEX: 48.82 KG/M2 | WEIGHT: 293 LBS | HEART RATE: 80 BPM

## 2022-08-08 DIAGNOSIS — E66.1 CLASS 3 DRUG-INDUCED OBESITY WITH BODY MASS INDEX (BMI) OF 45.0 TO 49.9 IN ADULT, UNSPECIFIED WHETHER SERIOUS COMORBIDITY PRESENT: Primary | ICD-10-CM

## 2022-08-08 DIAGNOSIS — Z00.00 ANNUAL PHYSICAL EXAM: ICD-10-CM

## 2022-08-08 DIAGNOSIS — R53.83 FATIGUE, UNSPECIFIED TYPE: ICD-10-CM

## 2022-08-08 DIAGNOSIS — Z51.81 MEDICATION MONITORING ENCOUNTER: ICD-10-CM

## 2022-08-08 PROCEDURE — 80305 DRUG TEST PRSMV DIR OPT OBS: CPT | Performed by: NURSE PRACTITIONER

## 2022-08-08 PROCEDURE — 99396 PREV VISIT EST AGE 40-64: CPT | Performed by: NURSE PRACTITIONER

## 2022-08-08 RX ORDER — PHENTERMINE HYDROCHLORIDE 37.5 MG/1
37.5 CAPSULE ORAL EVERY MORNING
Qty: 30 CAPSULE | Refills: 0 | Status: SHIPPED | OUTPATIENT
Start: 2022-08-08 | End: 2022-09-22 | Stop reason: SDUPTHER

## 2022-08-08 NOTE — PROGRESS NOTES
Chief Complaint  Obesity (Patient is trying to lose weight, but is having a hard time ), Fatigue, Alopecia, and Annual Exam    Subjective        Catherine Reyes presents to [facility]  Obesity:  Has been eating better and walking and hasn't had any change in weight.  Has been eating less processed sugar and not eating out.  Discussed all options of weight loss.  Has been on phentermine in the past and helped until stopped.  Understands risks of PPH and need to lose 4 pounds a month.    Alopecia:  States has been losing more hair of late.  In the last month.    Fatigue:   States more fatigued in the last month.        Past History:    Medical History: has a past medical history of Ankle swelling, Kidney stones, Left knee pain, and Migraines.     Surgical History: has a past surgical history that includes  section; Cholecystectomy; and Laser ablation.     Family History: family history includes Diabetes in an other family member; Heart disease in an other family member; Hypertension in an other family member.     Social History: reports that she has quit smoking. She has never used smokeless tobacco. She reports that she does not drink alcohol and does not use drugs.    Allergies: Codeine          Current Outpatient Medications:   •  diclofenac (VOLTAREN) 75 MG EC tablet, Take 1 tablet by mouth 2 (Two) Times a Day., Disp: 60 tablet, Rfl: 1  •  atorvastatin (LIPITOR) 20 MG tablet, Take 1 tablet by mouth Daily., Disp: 90 tablet, Rfl: 3    Medications Discontinued During This Encounter   Medication Reason   • azithromycin (ZITHROMAX) 250 MG tablet *Therapy completed         Review of Systems   Constitutional: Negative for fever.   Respiratory: Negative for cough and shortness of breath.    Cardiovascular: Negative for chest pain, palpitations and leg swelling.   Neurological: Negative for dizziness, weakness, numbness and headache.        Objective         Vitals:    22 1141  "  BP: 126/82   BP Location: Right arm   Patient Position: Sitting   Cuff Size: Adult   Pulse: 80   Temp: 97.5 °F (36.4 °C)   TempSrc: Temporal   SpO2: 97%   Weight: 134 kg (296 lb 3.2 oz)   Height: 165.1 cm (65\")     Body mass index is 49.29 kg/m².         Physical Exam  Vitals reviewed.   Constitutional:       Appearance: Normal appearance. She is well-developed.   HENT:      Head: Normocephalic and atraumatic.      Mouth/Throat:      Pharynx: No oropharyngeal exudate.   Eyes:      Conjunctiva/sclera: Conjunctivae normal.      Pupils: Pupils are equal, round, and reactive to light.   Cardiovascular:      Rate and Rhythm: Normal rate and regular rhythm.      Heart sounds: Normal heart sounds. No murmur heard.    No friction rub. No gallop.   Pulmonary:      Effort: Pulmonary effort is normal.      Breath sounds: Normal breath sounds. No wheezing or rhonchi.   Skin:     General: Skin is warm and dry.   Neurological:      Mental Status: She is alert and oriented to person, place, and time.   Psychiatric:         Mood and Affect: Mood and affect normal.         Behavior: Behavior normal.         Thought Content: Thought content normal.         Judgment: Judgment normal.             Result Review :               Assessment and Plan     Diagnoses and all orders for this visit:    1. 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 Annual physical exam  Comments:  discussed diet and exercise  Orders:  -     Comprehensive Metabolic Panel; Future  -     Lipid Panel With / Chol / HDL Ratio; Future  -     Urinalysis With Culture If Indicated -; Future  -     CBC & Differential; Future  -     TSH Rfx On Abnormal To Free T4; Future  -     Hemoglobin A1c; Future    3. Fatigue, unspecified type  -     Ferritin; Future  -     Folate; Future  -     Vitamin D 25 hydroxy; Future  -     Vitamin B12; Future              Follow Up "     No follow-ups on file.    Patient was given instructions and counseling regarding her condition or for health maintenance advice. Please see specific information pulled into the AVS if appropriate.

## 2022-08-09 ENCOUNTER — TELEPHONE (OUTPATIENT)
Dept: FAMILY MEDICINE CLINIC | Facility: CLINIC | Age: 42
End: 2022-08-09

## 2022-08-09 NOTE — TELEPHONE ENCOUNTER
Caller: GERRI    Relationship: [facility]     Best call back number: [phone]    What is the best time to reach you: ANY     What was the call regarding: GERRI WANTED TO LET PCP KNOW THAT phentermine 37.5 MG capsule NEEDS A PA     Do you require a callback: IF NEEDED

## 2022-08-12 ENCOUNTER — LAB (OUTPATIENT)
Dept: LAB | Facility: HOSPITAL | Age: 42
End: 2022-08-12

## 2022-08-12 DIAGNOSIS — Z00.00 ANNUAL PHYSICAL EXAM: ICD-10-CM

## 2022-08-12 DIAGNOSIS — R53.83 FATIGUE, UNSPECIFIED TYPE: ICD-10-CM

## 2022-08-12 LAB
25(OH)D3 SERPL-MCNC: 43.2 NG/ML (ref 30–100)
ALBUMIN SERPL-MCNC: 3.9 G/DL (ref 3.5–5.2)
ALBUMIN/GLOB SERPL: 1.3 G/DL
ALP SERPL-CCNC: 117 U/L (ref 39–117)
ALT SERPL W P-5'-P-CCNC: 14 U/L (ref 1–33)
ANION GAP SERPL CALCULATED.3IONS-SCNC: 12.8 MMOL/L (ref 5–15)
AST SERPL-CCNC: 12 U/L (ref 1–32)
BASOPHILS # BLD AUTO: 0.05 10*3/MM3 (ref 0–0.2)
BASOPHILS NFR BLD AUTO: 0.5 % (ref 0–1.5)
BILIRUB SERPL-MCNC: 0.3 MG/DL (ref 0–1.2)
BILIRUB UR QL STRIP: NEGATIVE
BUN SERPL-MCNC: 10 MG/DL (ref 6–20)
BUN/CREAT SERPL: 13.2 (ref 7–25)
CALCIUM SPEC-SCNC: 9.1 MG/DL (ref 8.6–10.5)
CHLORIDE SERPL-SCNC: 104 MMOL/L (ref 98–107)
CHOLEST SERPL-MCNC: 199 MG/DL (ref 0–200)
CLARITY UR: CLEAR
CO2 SERPL-SCNC: 23.2 MMOL/L (ref 22–29)
COLOR UR: YELLOW
CREAT SERPL-MCNC: 0.76 MG/DL (ref 0.57–1)
DEPRECATED RDW RBC AUTO: 42.6 FL (ref 37–54)
EGFRCR SERPLBLD CKD-EPI 2021: 99.2 ML/MIN/1.73
EOSINOPHIL # BLD AUTO: 0.37 10*3/MM3 (ref 0–0.4)
EOSINOPHIL NFR BLD AUTO: 3.6 % (ref 0.3–6.2)
ERYTHROCYTE [DISTWIDTH] IN BLOOD BY AUTOMATED COUNT: 13.4 % (ref 12.3–15.4)
FERRITIN SERPL-MCNC: 68.3 NG/ML (ref 13–150)
FOLATE SERPL-MCNC: 10.5 NG/ML (ref 4.78–24.2)
GLOBULIN UR ELPH-MCNC: 3 GM/DL
GLUCOSE SERPL-MCNC: 99 MG/DL (ref 65–99)
GLUCOSE UR STRIP-MCNC: NEGATIVE MG/DL
HBA1C MFR BLD: 5.9 % (ref 4.8–5.6)
HCT VFR BLD AUTO: 43.8 % (ref 34–46.6)
HDLC SERPL QL: 4.33
HDLC SERPL-MCNC: 46 MG/DL (ref 40–60)
HGB BLD-MCNC: 14.4 G/DL (ref 12–15.9)
HGB UR QL STRIP.AUTO: NEGATIVE
IMM GRANULOCYTES # BLD AUTO: 0.04 10*3/MM3 (ref 0–0.05)
IMM GRANULOCYTES NFR BLD AUTO: 0.4 % (ref 0–0.5)
KETONES UR QL STRIP: NEGATIVE
LDLC SERPL CALC-MCNC: 127 MG/DL (ref 0–100)
LEUKOCYTE ESTERASE UR QL STRIP.AUTO: NEGATIVE
LYMPHOCYTES # BLD AUTO: 2.11 10*3/MM3 (ref 0.7–3.1)
LYMPHOCYTES NFR BLD AUTO: 20.3 % (ref 19.6–45.3)
MCH RBC QN AUTO: 29 PG (ref 26.6–33)
MCHC RBC AUTO-ENTMCNC: 32.9 G/DL (ref 31.5–35.7)
MCV RBC AUTO: 88.1 FL (ref 79–97)
MONOCYTES # BLD AUTO: 0.64 10*3/MM3 (ref 0.1–0.9)
MONOCYTES NFR BLD AUTO: 6.2 % (ref 5–12)
NEUTROPHILS NFR BLD AUTO: 69 % (ref 42.7–76)
NEUTROPHILS NFR BLD AUTO: 7.16 10*3/MM3 (ref 1.7–7)
NITRITE UR QL STRIP: NEGATIVE
NRBC BLD AUTO-RTO: 0 /100 WBC (ref 0–0.2)
PH UR STRIP.AUTO: 7 [PH] (ref 5–8)
PLATELET # BLD AUTO: 318 10*3/MM3 (ref 140–450)
PMV BLD AUTO: 10 FL (ref 6–12)
POTASSIUM SERPL-SCNC: 4.7 MMOL/L (ref 3.5–5.2)
PROT SERPL-MCNC: 6.9 G/DL (ref 6–8.5)
PROT UR QL STRIP: NEGATIVE
RBC # BLD AUTO: 4.97 10*6/MM3 (ref 3.77–5.28)
SODIUM SERPL-SCNC: 140 MMOL/L (ref 136–145)
SP GR UR STRIP: <=1.005 (ref 1–1.03)
TRIGL SERPL-MCNC: 143 MG/DL (ref 0–150)
TSH SERPL DL<=0.05 MIU/L-ACNC: 4.07 UIU/ML (ref 0.27–4.2)
UROBILINOGEN UR QL STRIP: NORMAL
VIT B12 BLD-MCNC: 246 PG/ML (ref 211–946)
VLDLC SERPL-MCNC: 26 MG/DL (ref 5–40)
WBC NRBC COR # BLD: 10.37 10*3/MM3 (ref 3.4–10.8)

## 2022-08-12 PROCEDURE — 82728 ASSAY OF FERRITIN: CPT

## 2022-08-12 PROCEDURE — 83036 HEMOGLOBIN GLYCOSYLATED A1C: CPT

## 2022-08-12 PROCEDURE — 81003 URINALYSIS AUTO W/O SCOPE: CPT

## 2022-08-12 PROCEDURE — 80061 LIPID PANEL: CPT

## 2022-08-12 PROCEDURE — 36415 COLL VENOUS BLD VENIPUNCTURE: CPT

## 2022-08-12 PROCEDURE — 80050 GENERAL HEALTH PANEL: CPT

## 2022-08-12 PROCEDURE — 82607 VITAMIN B-12: CPT

## 2022-08-12 PROCEDURE — 82746 ASSAY OF FOLIC ACID SERUM: CPT

## 2022-08-12 PROCEDURE — 82306 VITAMIN D 25 HYDROXY: CPT

## 2022-09-22 ENCOUNTER — OFFICE VISIT (OUTPATIENT)
Dept: FAMILY MEDICINE CLINIC | Facility: CLINIC | Age: 42
End: 2022-09-22

## 2022-09-22 VITALS
RESPIRATION RATE: 16 BRPM | TEMPERATURE: 96.8 F | DIASTOLIC BLOOD PRESSURE: 82 MMHG | WEIGHT: 280.4 LBS | HEIGHT: 65 IN | OXYGEN SATURATION: 97 % | SYSTOLIC BLOOD PRESSURE: 120 MMHG | HEART RATE: 97 BPM | BODY MASS INDEX: 46.72 KG/M2

## 2022-09-22 DIAGNOSIS — E66.1 CLASS 3 DRUG-INDUCED OBESITY WITH BODY MASS INDEX (BMI) OF 45.0 TO 49.9 IN ADULT, UNSPECIFIED WHETHER SERIOUS COMORBIDITY PRESENT: ICD-10-CM

## 2022-09-22 PROBLEM — N20.0 KIDNEY STONES: Status: ACTIVE | Noted: 2022-09-22

## 2022-09-22 PROBLEM — M25.562 LEFT KNEE PAIN: Status: ACTIVE | Noted: 2022-09-22

## 2022-09-22 PROBLEM — G43.909 MIGRAINES: Status: ACTIVE | Noted: 2022-09-22

## 2022-09-22 PROCEDURE — 99213 OFFICE O/P EST LOW 20 MIN: CPT | Performed by: NURSE PRACTITIONER

## 2022-09-22 RX ORDER — PHENTERMINE HYDROCHLORIDE 37.5 MG/1
37.5 CAPSULE ORAL EVERY MORNING
Qty: 30 CAPSULE | Refills: 0 | Status: SHIPPED | OUTPATIENT
Start: 2022-09-22 | End: 2022-10-24 | Stop reason: SDUPTHER

## 2022-09-22 NOTE — PROGRESS NOTES
Answers for HPI/ROS submitted by the patient on 9/15/2022  Please describe your symptoms.: Getting weight check  Have you had these symptoms before?: No  How long have you been having these symptoms?: Greater than 2 weeks  Please list any medications you are currently taking for this condition.: Phentermine  What is the primary reason for your visit?: Other    Chief Complaint  Obesity    Subjective        Catherine Reyes presents to [facility]  History of Present Illness  Has increased exercise and is working out 3 days a week.  Has lost 16 pounds.  Denies chest pain and shortness of breath.  Has stopped eating out.        Past History:    Medical History: has a past medical history of Ankle swelling, Kidney stones, Left knee pain, Migraines, and Obesity (I).     Surgical History: has a past surgical history that includes  section; Cholecystectomy; and Laser ablation.     Family History: family history includes Arthritis in her mother; Cancer in her brother; Diabetes in her father and another family member; Heart disease in her father, maternal grandfather, and another family member; Hyperlipidemia in her father and mother; Hypertension in an other family member; Kidney disease in her mother; Stroke in her mother.     Social History: reports that she has quit smoking. She has never used smokeless tobacco. She reports that she does not drink alcohol and does not use drugs.    Allergies: Codeine          Current Outpatient Medications:   •  diclofenac (VOLTAREN) 75 MG EC tablet, Take 1 tablet by mouth 2 (Two) Times a Day. (Patient taking differently: Take 75 mg by mouth 2 (Two) Times a Day As Needed.), Disp: 60 tablet, Rfl: 1  •  phentermine 37.5 MG capsule, Take 1 capsule by mouth Every Morning., Disp: 30 capsule, Rfl: 0  •  atorvastatin (LIPITOR) 20 MG tablet, Take 1 tablet by mouth Daily., Disp: 90 tablet, Rfl: 3    Medications Discontinued During This Encounter   Medication  "Reason   • phentermine 37.5 MG capsule Reorder         Review of Systems   Constitutional: Negative for fever.   Respiratory: Negative for cough and shortness of breath.    Cardiovascular: Negative for chest pain, palpitations and leg swelling.   Neurological: Negative for dizziness, weakness, numbness and headache.        Objective         Vitals:    09/22/22 1104   BP: 120/82   BP Location: Right arm   Patient Position: Sitting   Cuff Size: Large Adult   Pulse: 97   Resp: 16   Temp: 96.8 °F (36 °C)   TempSrc: Infrared   SpO2: 97%   Weight: 127 kg (280 lb 6.4 oz)   Height: 165.1 cm (65\")     Body mass index is 46.66 kg/m².         Physical Exam  Vitals reviewed.   Constitutional:       Appearance: Normal appearance. She is well-developed.   HENT:      Head: Normocephalic and atraumatic.      Mouth/Throat:      Pharynx: No oropharyngeal exudate.   Eyes:      Conjunctiva/sclera: Conjunctivae normal.      Pupils: Pupils are equal, round, and reactive to light.   Cardiovascular:      Rate and Rhythm: Normal rate and regular rhythm.      Heart sounds: Normal heart sounds. No murmur heard.    No friction rub. No gallop.   Pulmonary:      Effort: Pulmonary effort is normal.      Breath sounds: Normal breath sounds. No wheezing or rhonchi.   Skin:     General: Skin is warm and dry.   Neurological:      Mental Status: She is alert and oriented to person, place, and time.   Psychiatric:         Mood and Affect: Mood and affect normal.         Behavior: Behavior normal.         Thought Content: Thought content normal.         Judgment: Judgment normal.             Result Review :               Assessment and Plan     Diagnoses and all orders for this visit:    1. 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  Dispense: 30 capsule; Refill: 0              Follow Up "     Return in about 1 month (around 10/22/2022).    Patient was given instructions and counseling regarding her condition or for health maintenance advice. Please see specific information pulled into the AVS if appropriate.

## 2022-10-24 ENCOUNTER — OFFICE VISIT (OUTPATIENT)
Dept: FAMILY MEDICINE CLINIC | Facility: CLINIC | Age: 42
End: 2022-10-24

## 2022-10-24 VITALS
OXYGEN SATURATION: 96 % | TEMPERATURE: 96 F | WEIGHT: 270.4 LBS | HEIGHT: 65 IN | DIASTOLIC BLOOD PRESSURE: 84 MMHG | BODY MASS INDEX: 45.05 KG/M2 | RESPIRATION RATE: 16 BRPM | SYSTOLIC BLOOD PRESSURE: 132 MMHG | HEART RATE: 95 BPM

## 2022-10-24 DIAGNOSIS — E66.1 CLASS 3 DRUG-INDUCED OBESITY WITH BODY MASS INDEX (BMI) OF 45.0 TO 49.9 IN ADULT, UNSPECIFIED WHETHER SERIOUS COMORBIDITY PRESENT: ICD-10-CM

## 2022-10-24 PROCEDURE — 99213 OFFICE O/P EST LOW 20 MIN: CPT | Performed by: NURSE PRACTITIONER

## 2022-10-24 RX ORDER — PHENTERMINE HYDROCHLORIDE 37.5 MG/1
37.5 CAPSULE ORAL EVERY MORNING
Qty: 30 CAPSULE | Refills: 0 | Status: SHIPPED | OUTPATIENT
Start: 2022-10-24 | End: 2022-11-28 | Stop reason: SDUPTHER

## 2022-10-24 NOTE — PROGRESS NOTES
Chief Complaint  Obesity    Subjective        Catherine Reyes presents to [facility]  History of Present Illness  Obesity and has lost 12 pounds.  And denies chest pain or shortness of breath.  Has elle eating less carbs and drinking more water.  Has joined a gym and goes 2-3 times a week.  Obesity  Pertinent negatives include no chest pain, coughing, fever, numbness or weakness.         Past History:    Medical History: has a past medical history of Ankle swelling, Kidney stones, Left knee pain, Migraines, and Obesity (I).     Surgical History: has a past surgical history that includes  section; Cholecystectomy; and Laser ablation.     Family History: family history includes Arthritis in her mother; Cancer in her brother; Diabetes in her father and another family member; Heart disease in her father, maternal grandfather, and another family member; Hyperlipidemia in her father and mother; Hypertension in an other family member; Kidney disease in her mother; Stroke in her mother.     Social History: reports that she has quit smoking. She has never used smokeless tobacco. She reports that she does not drink alcohol and does not use drugs.    Allergies: Codeine          Current Outpatient Medications:   •  diclofenac (VOLTAREN) 75 MG EC tablet, Take 1 tablet by mouth 2 (Two) Times a Day. (Patient taking differently: Take 1 tablet by mouth 2 (Two) Times a Day As Needed.), Disp: 60 tablet, Rfl: 1  •  phentermine 37.5 MG capsule, Take 1 capsule by mouth Every Morning., Disp: 30 capsule, Rfl: 0  •  atorvastatin (LIPITOR) 20 MG tablet, Take 1 tablet by mouth Daily., Disp: 90 tablet, Rfl: 3    Medications Discontinued During This Encounter   Medication Reason   • phentermine 37.5 MG capsule Reorder         Review of Systems   Constitutional: Negative for fever.   Respiratory: Negative for cough and shortness of breath.    Cardiovascular: Negative for chest pain, palpitations and leg  "swelling.   Neurological: Negative for dizziness, weakness, numbness and headache.        Objective         Vitals:    10/24/22 1147   BP: 132/84   BP Location: Right arm   Patient Position: Sitting   Cuff Size: Large Adult   Pulse: 95   Resp: 16   Temp: 96 °F (35.6 °C)   TempSrc: Temporal   SpO2: 96%   Weight: 123 kg (270 lb 6.4 oz)   Height: 165.1 cm (65\")     Body mass index is 45 kg/m².         Physical Exam  Vitals reviewed.   Constitutional:       Appearance: Normal appearance. She is well-developed.   HENT:      Head: Normocephalic and atraumatic.      Mouth/Throat:      Pharynx: No oropharyngeal exudate.   Eyes:      Conjunctiva/sclera: Conjunctivae normal.      Pupils: Pupils are equal, round, and reactive to light.   Cardiovascular:      Rate and Rhythm: Normal rate and regular rhythm.      Heart sounds: Normal heart sounds. No murmur heard.    No friction rub. No gallop.   Pulmonary:      Effort: Pulmonary effort is normal.      Breath sounds: Normal breath sounds. No wheezing or rhonchi.   Skin:     General: Skin is warm and dry.   Neurological:      Mental Status: She is alert and oriented to person, place, and time.   Psychiatric:         Mood and Affect: Mood and affect normal.         Behavior: Behavior normal.         Thought Content: Thought content normal.         Judgment: Judgment normal.             Result Review :               Assessment and Plan     Diagnoses and all orders for this visit:    1. 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  Dispense: 30 capsule; Refill: 0              Follow Up     Return in about 1 month (around 11/24/2022).    Patient was given instructions and counseling regarding her condition or for health maintenance advice. Please see specific information pulled into the AVS if appropriate.         "

## 2022-10-28 ENCOUNTER — TELEPHONE (OUTPATIENT)
Dept: FAMILY MEDICINE CLINIC | Facility: CLINIC | Age: 42
End: 2022-10-28

## 2022-10-28 NOTE — TELEPHONE ENCOUNTER
Caller: [facility]      Best call back number: [phone]      Additional information or concerns: [facility] REPORTS THAT PATIENT'S MEDICATION phentermine 37.5 MG capsule PRIOR AUTHORIZATION IS SET TO   - AND IS ELIGIBLE FOR RENEWAL

## 2022-10-28 NOTE — TELEPHONE ENCOUNTER
When I do the PA it stated        The [facility] Prior Authorization Team is unable to review this request for prior authorization as the medication has been previously approved. This approval will  2022. No further action is needed at this time.

## 2022-11-28 ENCOUNTER — OFFICE VISIT (OUTPATIENT)
Dept: FAMILY MEDICINE CLINIC | Facility: CLINIC | Age: 42
End: 2022-11-28

## 2022-11-28 VITALS
OXYGEN SATURATION: 98 % | DIASTOLIC BLOOD PRESSURE: 85 MMHG | BODY MASS INDEX: 43.89 KG/M2 | SYSTOLIC BLOOD PRESSURE: 120 MMHG | HEIGHT: 65 IN | WEIGHT: 263.4 LBS | TEMPERATURE: 97.9 F | HEART RATE: 88 BPM

## 2022-11-28 DIAGNOSIS — E66.1 CLASS 3 DRUG-INDUCED OBESITY WITH BODY MASS INDEX (BMI) OF 40.0 TO 44.9 IN ADULT, UNSPECIFIED WHETHER SERIOUS COMORBIDITY PRESENT: ICD-10-CM

## 2022-11-28 PROCEDURE — 99213 OFFICE O/P EST LOW 20 MIN: CPT | Performed by: NURSE PRACTITIONER

## 2022-11-28 RX ORDER — PHENTERMINE HYDROCHLORIDE 37.5 MG/1
37.5 CAPSULE ORAL EVERY MORNING
Qty: 30 CAPSULE | Refills: 0 | Status: SHIPPED | OUTPATIENT
Start: 2022-11-28 | End: 2023-01-12 | Stop reason: SDUPTHER

## 2022-11-28 NOTE — PROGRESS NOTES
Chief Complaint  Obesity (Phentermine follow up )    Subjective        Catherine Reyes presents to [facility]  History of Present Illness  Has lost 7 pounds in the last month .  Blood pressure is good.  Denies chest pain or shortness of breath.  Obesity  Pertinent negatives include no chest pain, coughing, fever, numbness or weakness.         Past History:    Medical History: has a past medical history of Ankle swelling, Kidney stones, Left knee pain, Migraines, and Obesity (I).     Surgical History: has a past surgical history that includes  section; Cholecystectomy; and Laser ablation.     Family History: family history includes Arthritis in her mother; Cancer in her brother; Diabetes in her father and another family member; Heart disease in her father, maternal grandfather, and another family member; Hyperlipidemia in her father and mother; Hypertension in an other family member; Kidney disease in her mother; Stroke in her mother.     Social History: reports that she has quit smoking. She has never used smokeless tobacco. She reports that she does not drink alcohol and does not use drugs.    Allergies: Codeine          Current Outpatient Medications:   •  diclofenac (VOLTAREN) 75 MG EC tablet, Take 1 tablet by mouth 2 (Two) Times a Day. (Patient taking differently: Take 75 mg by mouth 2 (Two) Times a Day As Needed.), Disp: 60 tablet, Rfl: 1  •  phentermine 37.5 MG capsule, Take 1 capsule by mouth Every Morning., Disp: 30 capsule, Rfl: 0  •  atorvastatin (LIPITOR) 20 MG tablet, Take 1 tablet by mouth Daily., Disp: 90 tablet, Rfl: 3    Medications Discontinued During This Encounter   Medication Reason   • phentermine 37.5 MG capsule Reorder         Review of Systems   Constitutional: Negative for fever.   Respiratory: Negative for cough and shortness of breath.    Cardiovascular: Negative for chest pain, palpitations and leg swelling.   Neurological: Negative for dizziness,  "weakness, numbness and headache.        Objective         Vitals:    11/28/22 1051   BP: 120/85   BP Location: Right arm   Patient Position: Sitting   Cuff Size: Adult   Pulse: 88   Temp: 97.9 °F (36.6 °C)   TempSrc: Temporal   SpO2: 98%   Weight: 119 kg (263 lb 6.4 oz)   Height: 165.1 cm (65\")     Body mass index is 43.83 kg/m².         Physical Exam  Vitals reviewed.   Constitutional:       Appearance: Normal appearance. She is well-developed.   HENT:      Head: Normocephalic and atraumatic.      Mouth/Throat:      Pharynx: No oropharyngeal exudate.   Eyes:      Conjunctiva/sclera: Conjunctivae normal.      Pupils: Pupils are equal, round, and reactive to light.   Cardiovascular:      Rate and Rhythm: Normal rate and regular rhythm.      Heart sounds: Normal heart sounds. No murmur heard.    No friction rub. No gallop.   Pulmonary:      Effort: Pulmonary effort is normal.      Breath sounds: Normal breath sounds. No wheezing or rhonchi.   Skin:     General: Skin is warm and dry.   Neurological:      Mental Status: She is alert and oriented to person, place, and time.   Psychiatric:         Mood and Affect: Mood and affect normal.         Behavior: Behavior normal.         Thought Content: Thought content normal.         Judgment: Judgment normal.             Result Review :               Assessment and Plan     Diagnoses and all orders for this visit:    1. Class 3 drug-induced obesity with body mass index (BMI) of 40.0 to 44.9 in adult, unspecified whether serious comorbidity present (HCC)  -     phentermine 37.5 MG capsule; Take 1 capsule by mouth Every Morning.  Dispense: 30 capsule; Refill: 0              Follow Up     Return in about 1 month (around 12/28/2022).    Patient was given instructions and counseling regarding her condition or for health maintenance advice. Please see specific information pulled into the AVS if appropriate.         "

## 2022-11-29 ENCOUNTER — TELEPHONE (OUTPATIENT)
Dept: FAMILY MEDICINE CLINIC | Facility: CLINIC | Age: 42
End: 2022-11-29

## 2022-11-29 NOTE — TELEPHONE ENCOUNTER
Caller: [facility]    Relationship to patient: Other/ [facility]    Best call back number: [phone]    Patient is needing: [facility] CALLED AND SAID PATIENT'S PHENTERMINE IS NEEDING A PRIOR AUTHORIZATION

## 2023-01-12 ENCOUNTER — OFFICE VISIT (OUTPATIENT)
Dept: FAMILY MEDICINE CLINIC | Facility: CLINIC | Age: 43
End: 2023-01-12
Payer: COMMERCIAL

## 2023-01-12 ENCOUNTER — TELEPHONE (OUTPATIENT)
Dept: FAMILY MEDICINE CLINIC | Facility: CLINIC | Age: 43
End: 2023-01-12

## 2023-01-12 VITALS
SYSTOLIC BLOOD PRESSURE: 136 MMHG | BODY MASS INDEX: 43.49 KG/M2 | RESPIRATION RATE: 16 BRPM | OXYGEN SATURATION: 98 % | HEIGHT: 65 IN | WEIGHT: 261 LBS | TEMPERATURE: 96.2 F | HEART RATE: 102 BPM | DIASTOLIC BLOOD PRESSURE: 84 MMHG

## 2023-01-12 DIAGNOSIS — E66.1 CLASS 3 DRUG-INDUCED OBESITY WITH BODY MASS INDEX (BMI) OF 40.0 TO 44.9 IN ADULT, UNSPECIFIED WHETHER SERIOUS COMORBIDITY PRESENT: ICD-10-CM

## 2023-01-12 PROCEDURE — 99213 OFFICE O/P EST LOW 20 MIN: CPT | Performed by: NURSE PRACTITIONER

## 2023-01-12 RX ORDER — PHENTERMINE HYDROCHLORIDE 37.5 MG/1
37.5 CAPSULE ORAL EVERY MORNING
Qty: 30 CAPSULE | Refills: 0 | Status: SHIPPED | OUTPATIENT
Start: 2023-01-12 | End: 2023-02-15 | Stop reason: SDUPTHER

## 2023-01-12 NOTE — PROGRESS NOTES
Chief Complaint  Obesity    Subjective        Catherine Reyes presents to [facility]  History of Present Illness  Obesity:  Has lost 2 pounds but has run out of medication.  Denies chest pain or shortness of air.  Did cheat a little over the holiday.  Obesity  Pertinent negatives include no chest pain, coughing, fever, numbness or weakness.         Past History:    Medical History: has a past medical history of Ankle swelling, Kidney stones, Left knee pain, Migraines, and Obesity (I).     Surgical History: has a past surgical history that includes  section; Cholecystectomy; and Laser ablation.     Family History: family history includes Arthritis in her mother; Cancer in her brother; Diabetes in her father and another family member; Heart disease in her father, maternal grandfather, and another family member; Hyperlipidemia in her father and mother; Hypertension in an other family member; Kidney disease in her mother; Stroke in her mother.     Social History: reports that she has quit smoking. She has never used smokeless tobacco. She reports that she does not drink alcohol and does not use drugs.    Allergies: Codeine          Current Outpatient Medications:   •  diclofenac (VOLTAREN) 75 MG EC tablet, Take 1 tablet by mouth 2 (Two) Times a Day. (Patient taking differently: Take 75 mg by mouth 2 (Two) Times a Day As Needed.), Disp: 60 tablet, Rfl: 1  •  phentermine 37.5 MG capsule, Take 1 capsule by mouth Every Morning., Disp: 30 capsule, Rfl: 0  •  atorvastatin (LIPITOR) 20 MG tablet, Take 1 tablet by mouth Daily., Disp: 90 tablet, Rfl: 3    Medications Discontinued During This Encounter   Medication Reason   • phentermine 37.5 MG capsule Reorder         Review of Systems   Constitutional: Negative for fever.   Respiratory: Negative for cough and shortness of breath.    Cardiovascular: Negative for chest pain, palpitations and leg swelling.   Neurological: Negative for  "dizziness, weakness, numbness and headache.        Objective         Vitals:    01/12/23 0719   BP: 136/84   BP Location: Right arm   Patient Position: Sitting   Cuff Size: Large Adult   Pulse: 102   Resp: 16   Temp: 96.2 °F (35.7 °C)   TempSrc: Temporal   SpO2: 98%   Weight: 118 kg (261 lb)   Height: 165.1 cm (65\")     Body mass index is 43.43 kg/m².         Physical Exam  Vitals reviewed.   Constitutional:       Appearance: Normal appearance. She is well-developed.   HENT:      Head: Normocephalic and atraumatic.      Mouth/Throat:      Pharynx: No oropharyngeal exudate.   Eyes:      Conjunctiva/sclera: Conjunctivae normal.      Pupils: Pupils are equal, round, and reactive to light.   Cardiovascular:      Rate and Rhythm: Normal rate and regular rhythm.      Heart sounds: Normal heart sounds. No murmur heard.    No friction rub. No gallop.   Pulmonary:      Effort: Pulmonary effort is normal.      Breath sounds: Normal breath sounds. No wheezing or rhonchi.   Skin:     General: Skin is warm and dry.   Neurological:      Mental Status: She is alert and oriented to person, place, and time.   Psychiatric:         Mood and Affect: Mood and affect normal.         Behavior: Behavior normal.         Thought Content: Thought content normal.         Judgment: Judgment normal.             Result Review :               Assessment and Plan     Diagnoses and all orders for this visit:    1. Class 3 drug-induced obesity with body mass index (BMI) of 40.0 to 44.9 in adult, unspecified whether serious comorbidity present (HCC)  -     phentermine 37.5 MG capsule; Take 1 capsule by mouth Every Morning.  Dispense: 30 capsule; Refill: 0              Follow Up     Return in about 1 month (around 2/12/2023).    Patient was given instructions and counseling regarding her condition or for health maintenance advice. Please see specific information pulled into the AVS if appropriate.         "

## 2023-01-12 NOTE — TELEPHONE ENCOUNTER
lvmtcb     Patient was seen today 1/12/2023. Patient need a 1 month follow up with Ascencion Tay.     Hub to read and share

## 2023-02-15 ENCOUNTER — OFFICE VISIT (OUTPATIENT)
Dept: FAMILY MEDICINE CLINIC | Facility: CLINIC | Age: 43
End: 2023-02-15
Payer: COMMERCIAL

## 2023-02-15 VITALS
DIASTOLIC BLOOD PRESSURE: 78 MMHG | WEIGHT: 253.4 LBS | TEMPERATURE: 97.2 F | HEART RATE: 104 BPM | SYSTOLIC BLOOD PRESSURE: 142 MMHG | BODY MASS INDEX: 42.22 KG/M2 | HEIGHT: 65 IN | OXYGEN SATURATION: 99 %

## 2023-02-15 DIAGNOSIS — E66.1 CLASS 3 DRUG-INDUCED OBESITY WITH BODY MASS INDEX (BMI) OF 40.0 TO 44.9 IN ADULT, UNSPECIFIED WHETHER SERIOUS COMORBIDITY PRESENT: ICD-10-CM

## 2023-02-15 PROCEDURE — 99213 OFFICE O/P EST LOW 20 MIN: CPT | Performed by: NURSE PRACTITIONER

## 2023-02-15 NOTE — PROGRESS NOTES
Answers for HPI/ROS submitted by the patient on 2023  Please describe your symptoms.: Weight check  Have you had these symptoms before?: No  How long have you been having these symptoms?: Greater than 2 weeks  Please list any medications you are currently taking for this condition.: Same  What is the primary reason for your visit?: Other    Chief Complaint  Weight Loss (Phentermine check up/Patient states the medication is fine and she has no side affects from it /She stated she doesn't know if its working as well as it did it in the beginning)    Subjective        Catherine Reyes presents to [facility]  History of Present Illness  Obesity:  Denies chest pain or shortness of breath.    Her insurance will pay for Prom[employer].    Denies family history of MEN2 or Thyroid cancer.  When phentermine no longer works may consider shots.  Watching calories and fluids.  Still drinking diet mountain dew.  And is still exercising.  Weight Loss  Pertinent negatives include no chest pain, coughing, fever, numbness or weakness.       The following portions of the patient's history were personally reviewed and updated as appropriate: allergies, current medications, past medical history, past surgical history, past family history, and past social history.     Body mass index is 42.17 kg/m².    Class 3 Severe Obesity (BMI >=40). Obesity-related health conditions include the following: none. Obesity is improving with lifestyle modifications. BMI is is above average; BMI management plan is completed. We discussed portion control, increasing exercise and pharmacologic options including phentermine.      Past History:    Medical History: has a past medical history of Ankle swelling, Kidney stones, Left knee pain, Migraines, and Obesity (I).     Surgical History: has a past surgical history that includes  section; Cholecystectomy; and Laser ablation.     Family History: family history includes  "Arthritis in her mother; Cancer in her brother; Diabetes in her father and another family member; Heart disease in her father, maternal grandfather, and another family member; Hyperlipidemia in her father and mother; Hypertension in an other family member; Kidney disease in her mother; Stroke in her mother.     Social History: reports that she has quit smoking. She has never used smokeless tobacco. She reports that she does not drink alcohol and does not use drugs.    Allergies: Codeine          Current Outpatient Medications:   •  phentermine 37.5 MG capsule, Take 1 capsule by mouth Every Morning., Disp: 30 capsule, Rfl: 0  •  atorvastatin (LIPITOR) 20 MG tablet, Take 1 tablet by mouth Daily., Disp: 90 tablet, Rfl: 3  •  diclofenac (VOLTAREN) 75 MG EC tablet, Take 1 tablet by mouth 2 (Two) Times a Day. (Patient taking differently: Take 75 mg by mouth 2 (Two) Times a Day As Needed.), Disp: 60 tablet, Rfl: 1    Medications Discontinued During This Encounter   Medication Reason   • phentermine 37.5 MG capsule Reorder         Review of Systems   Constitutional: Negative for fever and unexpected weight loss.   Respiratory: Negative for cough and shortness of breath.    Cardiovascular: Negative for chest pain, palpitations and leg swelling.   Neurological: Negative for dizziness, weakness, numbness and headache.        Objective         Vitals:    02/15/23 0817   BP: 142/78   BP Location: Right arm   Patient Position: Sitting   Cuff Size: Adult   Pulse: 104   Temp: 97.2 °F (36.2 °C)   TempSrc: Temporal   SpO2: 99%   Weight: 115 kg (253 lb 6.4 oz)   Height: 165.1 cm (65\")     Body mass index is 42.17 kg/m².         Physical Exam  Vitals reviewed.   Constitutional:       Appearance: Normal appearance. She is well-developed.   HENT:      Head: Normocephalic and atraumatic.      Mouth/Throat:      Pharynx: No oropharyngeal exudate.   Eyes:      Conjunctiva/sclera: Conjunctivae normal.      Pupils: Pupils are equal, round, " and reactive to light.   Cardiovascular:      Rate and Rhythm: Normal rate and regular rhythm.      Heart sounds: Normal heart sounds. No murmur heard.    No friction rub. No gallop.   Pulmonary:      Effort: Pulmonary effort is normal.      Breath sounds: Normal breath sounds. No wheezing or rhonchi.   Skin:     General: Skin is warm and dry.   Neurological:      Mental Status: She is alert and oriented to person, place, and time.   Psychiatric:         Mood and Affect: Mood and affect normal.         Behavior: Behavior normal.         Thought Content: Thought content normal.         Judgment: Judgment normal.             Result Review :               Assessment and Plan     Diagnoses and all orders for this visit:    1. Class 3 drug-induced obesity with body mass index (BMI) of 40.0 to 44.9 in adult, unspecified whether serious comorbidity present (HCC)  -     phentermine 37.5 MG capsule; Take 1 capsule by mouth Every Morning.  Dispense: 30 capsule; Refill: 0              Follow Up     Return in about 1 month (around 3/15/2023).    Patient was given instructions and counseling regarding her condition or for health maintenance advice. Please see specific information pulled into the AVS if appropriate.

## 2023-02-16 RX ORDER — PHENTERMINE HYDROCHLORIDE 37.5 MG/1
37.5 CAPSULE ORAL EVERY MORNING
Qty: 30 CAPSULE | Refills: 0 | Status: SHIPPED | OUTPATIENT
Start: 2023-02-16 | End: 2023-03-27 | Stop reason: SDUPTHER

## 2023-03-27 ENCOUNTER — OFFICE VISIT (OUTPATIENT)
Dept: FAMILY MEDICINE CLINIC | Facility: CLINIC | Age: 43
End: 2023-03-27
Payer: COMMERCIAL

## 2023-03-27 VITALS
WEIGHT: 258.6 LBS | DIASTOLIC BLOOD PRESSURE: 86 MMHG | HEIGHT: 65 IN | TEMPERATURE: 96.5 F | BODY MASS INDEX: 43.09 KG/M2 | OXYGEN SATURATION: 100 % | RESPIRATION RATE: 16 BRPM | HEART RATE: 98 BPM | SYSTOLIC BLOOD PRESSURE: 144 MMHG

## 2023-03-27 DIAGNOSIS — E66.1 CLASS 3 DRUG-INDUCED OBESITY WITH BODY MASS INDEX (BMI) OF 40.0 TO 44.9 IN ADULT, UNSPECIFIED WHETHER SERIOUS COMORBIDITY PRESENT: ICD-10-CM

## 2023-03-27 PROCEDURE — 99213 OFFICE O/P EST LOW 20 MIN: CPT | Performed by: NURSE PRACTITIONER

## 2023-03-27 RX ORDER — PHENTERMINE HYDROCHLORIDE 37.5 MG/1
37.5 CAPSULE ORAL EVERY MORNING
Qty: 30 CAPSULE | Refills: 0 | Status: SHIPPED | OUTPATIENT
Start: 2023-03-27

## 2023-03-27 NOTE — PROGRESS NOTES
Chief Complaint  Obesity    Subjective        Catherine Reyes presents to [facility]  History of Present Illness  Obesity:  Did gain weight but recent vacation and hasn't been taking medication because of that.  Not having any cheat pain or shortness of breath. So gained weight with last visit.  Obesity  Pertinent negatives include no chest pain, coughing, fever, numbness or weakness.       The following portions of the patient's history were personally reviewed and updated as appropriate: allergies, current medications, past medical history, past surgical history, past family history, and past social history.     Body mass index is 43.03 kg/m².    Class 3 Severe Obesity (BMI >=40). Obesity-related health conditions include the following: none. Obesity is improving with lifestyle modifications. BMI is is above average; BMI management plan is completed. We discussed portion control and increasing exercise.      Past History:    Medical History: has a past medical history of Ankle swelling, Kidney stones, Left knee pain, Migraines, and Obesity (I).     Surgical History: has a past surgical history that includes  section; Cholecystectomy; and Laser ablation.     Family History: family history includes Arthritis in her mother; Cancer in her brother; Diabetes in her father and another family member; Heart disease in her father, maternal grandfather, and another family member; Hyperlipidemia in her father and mother; Hypertension in an other family member; Kidney disease in her mother; Stroke in her mother.     Social History: reports that she has quit smoking. She has never used smokeless tobacco. She reports that she does not drink alcohol and does not use drugs.    Allergies: Codeine          Current Outpatient Medications:   •  phentermine 37.5 MG capsule, Take 1 capsule by mouth Every Morning., Disp: 30 capsule, Rfl: 0  •  atorvastatin (LIPITOR) 20 MG tablet, Take 1 tablet by  "mouth Daily. (Patient not taking: Reported on 3/27/2023), Disp: 90 tablet, Rfl: 3  •  diclofenac (VOLTAREN) 75 MG EC tablet, Take 1 tablet by mouth 2 (Two) Times a Day. (Patient not taking: Reported on 3/27/2023), Disp: 60 tablet, Rfl: 1    Medications Discontinued During This Encounter   Medication Reason   • phentermine 37.5 MG capsule Reorder         Review of Systems   Constitutional: Negative for fever.   Respiratory: Negative for cough and shortness of breath.    Cardiovascular: Negative for chest pain, palpitations and leg swelling.   Neurological: Negative for dizziness, weakness, numbness and headache.        Objective         Vitals:    03/27/23 1138   BP: 144/86   BP Location: Right arm   Patient Position: Sitting   Cuff Size: Large Adult   Pulse: 98   Resp: 16   Temp: 96.5 °F (35.8 °C)   TempSrc: Temporal   SpO2: 100%   Weight: 117 kg (258 lb 9.6 oz)   Height: 165.1 cm (65\")     Body mass index is 43.03 kg/m².         Physical Exam  Vitals reviewed.   Constitutional:       Appearance: Normal appearance. She is well-developed.   HENT:      Head: Normocephalic and atraumatic.      Mouth/Throat:      Pharynx: No oropharyngeal exudate.   Eyes:      Conjunctiva/sclera: Conjunctivae normal.      Pupils: Pupils are equal, round, and reactive to light.   Cardiovascular:      Rate and Rhythm: Normal rate and regular rhythm.      Heart sounds: Normal heart sounds. No murmur heard.    No friction rub. No gallop.   Pulmonary:      Effort: Pulmonary effort is normal.      Breath sounds: Normal breath sounds. No wheezing or rhonchi.   Skin:     General: Skin is warm and dry.   Neurological:      Mental Status: She is alert and oriented to person, place, and time.   Psychiatric:         Mood and Affect: Mood and affect normal.         Behavior: Behavior normal.         Thought Content: Thought content normal.         Judgment: Judgment normal.             Result Review :               Assessment and Plan     Diagnoses " and all orders for this visit:    1. Class 3 drug-induced obesity with body mass index (BMI) of 40.0 to 44.9 in adult, unspecified whether serious comorbidity present (HCC)  -     phentermine 37.5 MG capsule; Take 1 capsule by mouth Every Morning.  Dispense: 30 capsule; Refill: 0      discusse wegovy and saxenda as options if next month she doesn't lose 4 pounds.        Follow Up     Return in about 1 month (around 4/27/2023).    Patient was given instructions and counseling regarding her condition or for health maintenance advice. Please see specific information pulled into the AVS if appropriate.

## 2023-04-09 ENCOUNTER — PATIENT MESSAGE (OUTPATIENT)
Dept: FAMILY MEDICINE CLINIC | Facility: CLINIC | Age: 43
End: 2023-04-09
Payer: COMMERCIAL

## 2023-04-09 DIAGNOSIS — E66.1 CLASS 3 DRUG-INDUCED OBESITY WITH BODY MASS INDEX (BMI) OF 40.0 TO 44.9 IN ADULT, UNSPECIFIED WHETHER SERIOUS COMORBIDITY PRESENT: Primary | ICD-10-CM

## 2023-04-10 RX ORDER — SEMAGLUTIDE 0.25 MG/.5ML
0.25 INJECTION, SOLUTION SUBCUTANEOUS WEEKLY
Qty: 2 ML | Refills: 0 | COMMUNITY
Start: 2023-04-10

## 2023-04-10 RX ORDER — SEMAGLUTIDE 0.5 MG/.5ML
0.5 INJECTION, SOLUTION SUBCUTANEOUS WEEKLY
Qty: 6 ML | Refills: 1 | Status: SHIPPED | OUTPATIENT
Start: 2023-04-10

## 2023-04-10 NOTE — TELEPHONE ENCOUNTER
From: Catherine Reyes  To: Ascencion Tay  Sent: 4/9/2023 9:02 PM EDT  Subject: Medication     Hello at our last appointment you said I could call to switch over to the shot for weight loss once a week and I think I am ready to do that now you also mentioned you had samples there to get me started so the insurance has time to get started. If that is something I can do without an appointment can you please let me know! If so I can come by anytime to  the sample. Thanks

## 2023-05-22 ENCOUNTER — TELEPHONE (OUTPATIENT)
Dept: FAMILY MEDICINE CLINIC | Facility: CLINIC | Age: 43
End: 2023-05-22
Payer: COMMERCIAL

## 2023-05-22 NOTE — TELEPHONE ENCOUNTER
Caller: Catherine Reyes    Relationship to patient: Self    Best call back number: [phone]    Patient is needing: PATIENT STATES THAT SHE WENT TO  HER WEGOVY AND IT WAS $1300. PATIENT IS INQUIRING IF SHE CAN GET ANY  SAMPLES OF .5 MG UNTIL SHE CAN GET HER INSURANCE ADJUSTED OR IF PROVIDER HAS ANY OTHER RECOMMENDATIONS. PLEASE CALL [facility].

## 2023-05-22 NOTE — TELEPHONE ENCOUNTER
PA for wegovy has been approved     Patient stated she has a $4000 deductible to be met before the price of the medication drops.      Patient is requesting samples

## 2024-01-22 ENCOUNTER — OFFICE VISIT (OUTPATIENT)
Dept: FAMILY MEDICINE CLINIC | Facility: CLINIC | Age: 44
End: 2024-01-22
Payer: COMMERCIAL

## 2024-01-22 VITALS
SYSTOLIC BLOOD PRESSURE: 136 MMHG | RESPIRATION RATE: 16 BRPM | BODY MASS INDEX: 47.92 KG/M2 | TEMPERATURE: 96 F | HEART RATE: 97 BPM | DIASTOLIC BLOOD PRESSURE: 92 MMHG | OXYGEN SATURATION: 99 % | HEIGHT: 65 IN | WEIGHT: 287.6 LBS

## 2024-01-22 DIAGNOSIS — Z12.31 ENCOUNTER FOR SCREENING MAMMOGRAM FOR MALIGNANT NEOPLASM OF BREAST: ICD-10-CM

## 2024-01-22 DIAGNOSIS — F17.210 CIGARETTE NICOTINE DEPENDENCE WITHOUT COMPLICATION: ICD-10-CM

## 2024-01-22 DIAGNOSIS — E66.1 CLASS 3 DRUG-INDUCED OBESITY WITH BODY MASS INDEX (BMI) OF 45.0 TO 49.9 IN ADULT, UNSPECIFIED WHETHER SERIOUS COMORBIDITY PRESENT: ICD-10-CM

## 2024-01-22 DIAGNOSIS — Z12.11 SCREEN FOR COLON CANCER: Primary | ICD-10-CM

## 2024-01-22 PROCEDURE — 99213 OFFICE O/P EST LOW 20 MIN: CPT | Performed by: NURSE PRACTITIONER

## 2024-01-22 RX ORDER — SEMAGLUTIDE 0.25 MG/.5ML
0.25 INJECTION, SOLUTION SUBCUTANEOUS WEEKLY
Qty: 2 ML | Refills: 0 | COMMUNITY
Start: 2024-01-22 | End: 2024-01-22 | Stop reason: SDUPTHER

## 2024-01-22 RX ORDER — SEMAGLUTIDE 0.25 MG/.5ML
0.25 INJECTION, SOLUTION SUBCUTANEOUS WEEKLY
Qty: 2 ML | Refills: 0 | Status: SHIPPED | OUTPATIENT
Start: 2024-01-22

## 2024-01-22 NOTE — PROGRESS NOTES
Chief Complaint  Nicotine Dependence (Has been smoking a little and would like patches) and Obesity (Would like to go back on the injection)    Subjective        Catherine Reyes presents to [facility]  History of Present Illness  Obesity:   Is interested in the weight loss options of GLP-'s for weight loss..  Discussed contraindications of family history of MEN 2 and/or medullary thyroid carcinoma.  Patient denies family or personal history of either.  Considering weight loss surgery.    Nicotine dependence.  Smoking once a week but knows if she doesn't stop now will start back full time.  Nicotine Dependence    Obesity  Pertinent negatives include no chest pain, coughing, fever, numbness or weakness.       The following portions of the patient's history were personally reviewed and updated as appropriate: allergies, current medications, past medical history, past surgical history, past family history, and past social history.     Body mass index is 47.86 kg/m².           Past History:    Medical History: has a past medical history of Ankle swelling, Kidney stones, Left knee pain, Migraines, and Obesity (I).     Surgical History: has a past surgical history that includes  section; Cholecystectomy; and Laser ablation.     Family History: family history includes Arthritis in her mother; Cancer in her brother; Diabetes in her father and another family member; Heart disease in her father, maternal grandfather, and another family member; Hyperlipidemia in her father and mother; Hypertension in an other family member; Kidney disease in her mother; Stroke in her mother.     Social History: reports that she has quit smoking. She has never used smokeless tobacco. She reports that she does not drink alcohol and does not use drugs.    Allergies: Codeine          Current Outpatient Medications:     diclofenac (VOLTAREN) 75 MG EC tablet, Take 1 tablet by mouth 2 (Two) Times a Day. (Patient  "taking differently: Take 1 tablet by mouth 2 (Two) Times a Day As Needed.), Disp: 60 tablet, Rfl: 1    Semaglutide-Weight Management (Wegovy) 0.25 MG/0.5ML solution auto-injector, Inject 0.25 mg under the skin into the appropriate area as directed 1 (One) Time Per Week., Disp: 2 mL, Rfl: 0    atorvastatin (LIPITOR) 20 MG tablet, Take 1 tablet by mouth Daily. (Patient not taking: Reported on 1/22/2024), Disp: 90 tablet, Rfl: 3    nicotine (Nicoderm CQ) 7 MG/24HR patch, Place 1 patch on the skin as directed by provider Daily., Disp: 28 each, Rfl: 0    phentermine 37.5 MG capsule, Take 1 capsule by mouth Every Morning. (Patient not taking: Reported on 1/22/2024), Disp: 30 capsule, Rfl: 0    Medications Discontinued During This Encounter   Medication Reason    Semaglutide-Weight Management (Wegovy) 0.5 MG/0.5ML solution auto-injector *Therapy completed    Semaglutide-Weight Management (Wegovy) 0.25 MG/0.5ML solution auto-injector Reorder         Review of Systems   Constitutional:  Negative for fever.   Respiratory:  Negative for cough and shortness of breath.    Cardiovascular:  Negative for chest pain, palpitations and leg swelling.   Neurological:  Negative for dizziness, weakness, numbness and headache.        Objective         Vitals:    01/22/24 1306   BP: 136/92   BP Location: Right arm   Patient Position: Sitting   Cuff Size: Large Adult   Pulse: 97   Resp: 16   Temp: 96 °F (35.6 °C)   TempSrc: Temporal   SpO2: 99%   Weight: 130 kg (287 lb 9.6 oz)   Height: 165.1 cm (65\")     Body mass index is 47.86 kg/m².         Physical Exam  Vitals reviewed.   Constitutional:       Appearance: Normal appearance. She is well-developed.   HENT:      Head: Normocephalic and atraumatic.      Mouth/Throat:      Pharynx: No oropharyngeal exudate.   Eyes:      Conjunctiva/sclera: Conjunctivae normal.      Pupils: Pupils are equal, round, and reactive to light.   Cardiovascular:      Rate and Rhythm: Normal rate and regular rhythm. "      Heart sounds: Normal heart sounds. No murmur heard.     No friction rub. No gallop.   Pulmonary:      Effort: Pulmonary effort is normal.      Breath sounds: Normal breath sounds. No wheezing or rhonchi.   Skin:     General: Skin is warm and dry.   Neurological:      Mental Status: She is alert and oriented to person, place, and time.   Psychiatric:         Mood and Affect: Mood and affect normal.         Behavior: Behavior normal.         Thought Content: Thought content normal.         Judgment: Judgment normal.             Result Review :               Assessment and Plan     Diagnoses and all orders for this visit:    1. Screen for colon cancer (Primary)  -     Cologuard - Stool, Per Rectum; Future    2. Encounter for screening mammogram for malignant neoplasm of breast  -     Mammo Screening Digital Tomosynthesis Bilateral With CAD; Future    3. Cigarette nicotine dependence without complication  -     nicotine (Nicoderm CQ) 7 MG/24HR patch; Place 1 patch on the skin as directed by provider Daily.  Dispense: 28 each; Refill: 0    4. 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  Dispense: 2 mL; Refill: 0              Follow Up     Return in about 4 months (around 5/22/2024).    Patient was given instructions and counseling regarding her condition or for health maintenance advice. Please see specific information pulled into the AVS if appropriate.

## 2024-03-08 ENCOUNTER — TELEPHONE (OUTPATIENT)
Dept: ORTHOPEDIC SURGERY | Facility: CLINIC | Age: 44
End: 2024-03-08
Payer: COMMERCIAL

## 2024-03-08 DIAGNOSIS — M17.11 OSTEOARTHRITIS OF RIGHT KNEE, UNSPECIFIED OSTEOARTHRITIS TYPE: ICD-10-CM

## 2024-03-08 RX ORDER — DICLOFENAC SODIUM 75 MG/1
75 TABLET, DELAYED RELEASE ORAL 2 TIMES DAILY
Qty: 60 TABLET | Refills: 1 | Status: SHIPPED | OUTPATIENT
Start: 2024-03-08

## 2024-03-08 NOTE — TELEPHONE ENCOUNTER
PATIENT REQUESTING REFILL OF DICLOFENAC. [facility].   SHE HAS NOT BEEN SEEN IN THE OFFICE SINCE 05/2022 SO I DID SCHEDULE HER AN APPT FOR FOLLOW UP ON 03/18/24 AT 1:45PM

## 2024-03-18 ENCOUNTER — OFFICE VISIT (OUTPATIENT)
Dept: ORTHOPEDIC SURGERY | Facility: CLINIC | Age: 44
End: 2024-03-18
Payer: COMMERCIAL

## 2024-03-18 VITALS
BODY MASS INDEX: 53.92 KG/M2 | SYSTOLIC BLOOD PRESSURE: 125 MMHG | OXYGEN SATURATION: 98 % | DIASTOLIC BLOOD PRESSURE: 82 MMHG | HEART RATE: 81 BPM | WEIGHT: 293 LBS | HEIGHT: 62 IN

## 2024-03-18 DIAGNOSIS — S83.241A ACUTE MEDIAL MENISCUS TEAR OF RIGHT KNEE, INITIAL ENCOUNTER: ICD-10-CM

## 2024-03-18 DIAGNOSIS — M17.11 OSTEOARTHRITIS OF RIGHT KNEE, UNSPECIFIED OSTEOARTHRITIS TYPE: Primary | ICD-10-CM

## 2024-03-18 PROCEDURE — 99213 OFFICE O/P EST LOW 20 MIN: CPT | Performed by: STUDENT IN AN ORGANIZED HEALTH CARE EDUCATION/TRAINING PROGRAM

## 2024-03-18 RX ORDER — DICLOFENAC SODIUM 75 MG/1
75 TABLET, DELAYED RELEASE ORAL 2 TIMES DAILY
Qty: 60 TABLET | Refills: 1 | Status: SHIPPED | OUTPATIENT
Start: 2024-03-18

## 2024-03-18 NOTE — PROGRESS NOTES
"Chief Complaint  Follow-up of the Right Knee    Subjective          Catherine Reyes presents to [facility] for   History of Present Illness    The patient presents here today for follow up evaluation of the right knee. The patient has been treating her right knee osteoarthritis and MMT conservatively. She continues to have popping in the knee. She has been taking NSAIDS with relief.     Allergies   Allergen Reactions    Codeine Nausea And Vomiting        Social History     Socioeconomic History    Marital status:    Tobacco Use    Smoking status: Former    Smokeless tobacco: Never    Tobacco comments:     < 0.5 PPD, 15 YEARS   Vaping Use    Vaping status: Never Used   Substance and Sexual Activity    Alcohol use: Never    Drug use: Never    Sexual activity: Yes     Partners: Male     Birth control/protection: None        I reviewed the patient's chief complaint, history of present illness, review of systems, past medical history, surgical history, family history, social history, medications, and allergy list.     REVIEW OF SYSTEMS    Constitutional: Denies fevers, chills, weight loss  Cardiovascular: Denies chest pain, shortness of breath  Skin: Denies rashes, acute skin changes  Neurologic: Denies headache, loss of consciousness  MSK: Right knee pain      Objective   Vital Signs:   /82   Pulse 81   Ht 157.5 cm (62\")   Wt 136 kg (300 lb)   SpO2 98%   BMI 54.87 kg/m²     Body mass index is 54.87 kg/m².    Physical Exam    General: Alert. No acute distress.   Right knee- ROM 0-120 no effusion. Mild crepitus. No joint line pain. Negative Lit's. Stable to anterior/posterior drawer. Stable to varus/valgus stress. Negative Lachman's. Negative Lit's. Sensation intact over the foot. Calf soft. Positive pulses.     Procedures    Imaging Results (Most Recent)       None                     Assessment and Plan        No results found.     Diagnoses and all orders for this " visit:    1. Osteoarthritis of right knee, unspecified osteoarthritis type (Primary)    2. Acute medial meniscus tear of right knee, initial encounter        Discussed the treatment plan with the patient.  Plan to continue conservative treatment at this time. Refill of diclofenac 75mg given today. The patient expressed understanding and wished to proceed.       Call or return if worsening symptoms.    Scribed for Dre Monahan MD by Aneta Mirza  03/18/2024   13:38 EDT         Follow Up       PRN    Patient was given instructions and counseling regarding her condition or for health maintenance advice. Please see specific information pulled into the AVS if appropriate.       I have personally performed the services described in this document as scribed by the above individual and it is both accurate and complete.     Dre Monahan MD  03/18/24  13:59 EDT

## 2024-04-05 ENCOUNTER — HOSPITAL ENCOUNTER (EMERGENCY)
Facility: HOSPITAL | Age: 44
Discharge: HOME | End: 2024-04-05
Attending: EMERGENCY MEDICINE
Payer: COMMERCIAL

## 2024-04-05 VITALS
BODY MASS INDEX: 51.64 KG/M2 | TEMPERATURE: 97.3 F | RESPIRATION RATE: 18 BRPM | HEIGHT: 63 IN | SYSTOLIC BLOOD PRESSURE: 156 MMHG | HEART RATE: 66 BPM | OXYGEN SATURATION: 100 % | DIASTOLIC BLOOD PRESSURE: 98 MMHG | WEIGHT: 291.45 LBS

## 2024-04-05 DIAGNOSIS — M54.50 ACUTE MIDLINE LOW BACK PAIN WITHOUT SCIATICA: Primary | ICD-10-CM

## 2024-04-05 PROCEDURE — 25010000002 DEXAMETHASONE SODIUM PHOSPHATE 10 MG/ML SOLUTION

## 2024-04-05 PROCEDURE — 97161 PT EVAL LOW COMPLEX 20 MIN: CPT | Performed by: PHYSICAL THERAPIST

## 2024-04-05 PROCEDURE — 97110 THERAPEUTIC EXERCISES: CPT | Performed by: PHYSICAL THERAPIST

## 2024-04-05 PROCEDURE — 25010000002 KETOROLAC TROMETHAMINE PER 15 MG

## 2024-04-05 PROCEDURE — 99282 EMERGENCY DEPT VISIT SF MDM: CPT

## 2024-04-05 PROCEDURE — 96372 THER/PROPH/DIAG INJ SC/IM: CPT

## 2024-04-05 RX ORDER — PREDNISONE 20 MG/1
20 TABLET ORAL 2 TIMES DAILY
Qty: 10 TABLET | Refills: 0 | Status: SHIPPED | OUTPATIENT
Start: 2024-04-05 | End: 2024-04-10

## 2024-04-05 RX ORDER — KETOROLAC TROMETHAMINE 10 MG/1
10 TABLET, FILM COATED ORAL EVERY 6 HOURS PRN
Qty: 15 TABLET | Refills: 0 | Status: SHIPPED | OUTPATIENT
Start: 2024-04-05

## 2024-04-05 RX ORDER — KETOROLAC TROMETHAMINE 30 MG/ML
30 INJECTION, SOLUTION INTRAMUSCULAR; INTRAVENOUS ONCE
Status: COMPLETED | OUTPATIENT
Start: 2024-04-05 | End: 2024-04-05

## 2024-04-05 RX ORDER — DEXAMETHASONE SODIUM PHOSPHATE 10 MG/ML
10 INJECTION, SOLUTION INTRAMUSCULAR; INTRAVENOUS ONCE
Status: COMPLETED | OUTPATIENT
Start: 2024-04-05 | End: 2024-04-05

## 2024-04-05 RX ORDER — CYCLOBENZAPRINE HCL 10 MG
10 TABLET ORAL 2 TIMES DAILY PRN
COMMUNITY

## 2024-04-05 RX ADMIN — KETOROLAC TROMETHAMINE 30 MG: 30 INJECTION, SOLUTION INTRAMUSCULAR; INTRAVENOUS at 10:48

## 2024-04-05 RX ADMIN — DEXAMETHASONE SODIUM PHOSPHATE 10 MG: 10 INJECTION INTRAMUSCULAR; INTRAVENOUS at 10:49

## 2024-04-05 NOTE — THERAPY EVALUATION
Patient Name: Catherine Reyes  : 1980    MRN: [id number]                              Today's Date: 2024       Admit Date: 2024    Visit Dx:     ICD-10-CM ICD-9-CM   1. Acute midline low back pain without sciatica  M54.50 724.2     Patient Active Problem List   Diagnosis    Osteoarthritis of right knee    Acute medial meniscus tear of right knee    Migraines    Left knee pain    Kidney stones     Past Medical History:   Diagnosis Date    Ankle swelling     Kidney stones     Left knee pain     Migraines     Obesity I     Past Surgical History:   Procedure Laterality Date     SECTION      CHOLECYSTECTOMY      LASER ABLATION      ABLATION      General Information       Row Name 24 1155          Physical Therapy Time and Intention    Document Type evaluation  -LR     Mode of Treatment individual therapy  -LR       Row Name 24 1155          General Information    Patient Profile Reviewed yes  -LR     Prior Level of Function independent:  -LR               User Key  (r) = Recorded By, (t) = Taken By, (c) = Cosigned By      Initials Name Provider Type    LR Elaina Patino, PT Physical Therapist                  History: Patient reports she has had low back pain for 1 week now.  She denies any known injury.  She states she did fly back on a plane recently and fell asleep on her tray table and when she woke up she was having back pain.  She states the pain is in the middle of her back.  She denies numbness and tingling.  She states the pain is the same whether she is resting or moving.  She has taken ibuprofen, Tylenol, muscle rub, ice, and heat.  Her pain is a 6/10 currently.    Objective:    Palpation: Tender to palpation at L1 spinous process    ROM:  Lumbar ROM:  Flexion: WNL and painful  Extension: WNL  L Side bending: WNL  R Side bending: WNL  L Rotation: WNL  R Rotation: WNL    B hip, knee, and ankle ROM WNL     Strength:  L Hip MMT:   R Hip MMT:  Flexion: 3+/5  Flexion:  3+/5  Abduction: 5/5  Abduction: 5/5  Adduction: 5/5  Adduction: 5/5    L Knee MMT:  R Knee MMT:  Flexion: 5/5  Flexion: 5/5  Extension: 5/5  Extension: 5/5  *pain with R knee extension    L Ankle MMT:  R Ankle MMT:  DF: 5/5  DF: 5/5  PF: 5/5   PF: 5/5    Special Tests:  Quadrant Test: negative  Slump Test: negative B  Straight Leg Raise Test: positive on R, negative on L  Contralateral Straight Leg Raise Test: NT  Obers Test: NT     Sensation: B LE sensation intact to light touch    Assessment/Plan:   Pt presents with a diagnosis of low back pain and has pain with lumbar flexion ROM and bilateral LE weakness that are limiting her ability to bend over.  Patient performed exercises for her lumbar region and was provided with a HEP handout.  She was instructed to perform exercises 2 times per day.    Goals:   LTG 1: The patient will be independent in HEP in order to decrease pain and improve tolerance to functional activities.  STATUS: Met    Interventions:   Manual Therapy: Not performed    Therapeutic Exercises: Posterior pelvic tilt (10 X5 seconds), hook lying hip adduction with pelvic tilt (10 X), marches with pelvic tilt (10 X), SK TC (2X 20 seconds), prone press up (3X 10 seconds), seated roll outs (5X)    Modalities: Not performed     Outcome Measures       Row Name 04/05/24 1158          Optimal Instrument    Optimal Instrument Optimal - 3  -LR     Lying Flat 2  -LR     Moving - lying to sitting 2  -LR     Bending/Stooping 3  -LR     From the list, choose the 3 activities you would most like to be able to do without any difficulty Bending/stooping;Moving -lying to sitting;Lying flat  -LR     Total Score Optimal - 3 5  -LR       Row Name 04/05/24 8178          Functional Assessment    Outcome Measure Options Optimal Instrument  -LR               User Key  (r) = Recorded By, (t) = Taken By, (c) = Cosigned By      Initials Name Provider Type    Elaina Martinez PT Physical Therapist                     Time  Calculation:   PT Evaluation Complexity  History, PT Evaluation Complexity: 1-2 personal factors and/or comorbidities  Examination of Body Systems (PT Eval Complexity): 1-2 elements  Clinical Presentation (PT Evaluation Complexity): stable  Clinical Decision Making (PT Evaluation Complexity): low complexity  Overall Complexity (PT Evaluation Complexity): low complexity     PT Charges       Row Name 04/05/24 1155             Time Calculation    PT Received On 04/05/24  -LR         Timed Charges    34386 - PT Therapeutic Exercise Minutes 10  -LR         Untimed Charges    PT Eval/Re-eval Minutes 16  -LR         Total Minutes    Timed Charges Total Minutes 10  -LR      Untimed Charges Total Minutes 16  -LR       Total Minutes 26  -LR                User Key  (r) = Recorded By, (t) = Taken By, (c) = Cosigned By      Initials Name Provider Type    LR Elaina Patino, PT Physical Therapist                  Therapy Charges for Today       Code Description Service Date Service Provider Modifiers Qty    16073455255 HC PT THER PROC EA 15 MIN 4/5/2024 Elaina Patino, PT GP 1    84443830406 HC PT EVAL LOW COMPLEXITY 2 4/5/2024 Elaina Patino, PT GP 1            PT G-Codes  Outcome Measure Options: Optimal Instrument       Elaina Patino, PT  4/5/2024

## 2024-04-05 NOTE — DISCHARGE INSTRUCTIONS
Your exam and symptoms are most consistent with acute low back pain. You are being discharged with toradol for pain. Take this medication with meals. Do not take this medication with other NSAIDs such as advil and aleve. You can take tylenol and flexeril with this medication.     As discussed, I am discharging with prednisone as well. You can start this in 3 days if your back pain is not any better.    Continue your physical therapy exercises at home.    Follow up with your PCP in 5-7 days especially if your symptoms worsen or persists. You may need a referral to outpatient Physical Therapy.    Return to the Emergency Department if you develop any uncontrollable fever, intractable pain, nausea, vomiting.

## 2024-04-05 NOTE — ED PROVIDER NOTES
Time: 10:53 AM EDT  Date of encounter:  2024  Independent Historian/Clinical History and Information was obtained by:   Patient    History is limited by: N/A    Chief Complaint: low back pain      History of Present Illness:  Patient is a 43 y.o. year old female who presents to the emergency department for evaluation of low back pain that started a few days ago.  States that she was in a flight recently and was hunched forward for about an hour. When she woke up, she started having back pain. Denies any radiating symptoms to the bilateral lower extremities.  Denies any previous back injuries.  Denies any recent falls or traumas.  Denies any loss of bowel or bladder functions.  Denies sinus anesthesia. She has been taking tylenol and advil with no relief. She was recently started on flexeril but it has not helped as well.     HPI    Patient Care Team  Primary Care Provider: Ascencion Tay APRN    Past Medical History:     Allergies   Allergen Reactions    Codeine Nausea And Vomiting     Past Medical History:   Diagnosis Date    Ankle swelling     Kidney stones     Left knee pain     Migraines     Obesity I     Past Surgical History:   Procedure Laterality Date     SECTION      CHOLECYSTECTOMY      LASER ABLATION      ABLATION     Family History   Problem Relation Age of Onset    Heart disease Other     Hypertension Other     Diabetes Other     Arthritis Mother     Hyperlipidemia Mother     Kidney disease Mother     Stroke Mother     Diabetes Father     Heart disease Father     Hyperlipidemia Father     Heart disease Maternal Grandfather     Cancer Brother        Home Medications:  Prior to Admission medications    Medication Sig Start Date End Date Taking? Authorizing Provider   atorvastatin (LIPITOR) 20 MG tablet Take 1 tablet by mouth Daily.  Patient not taking: Reported on 3/18/2024 9/15/21   Damion Navarro MD   cyclobenzaprine (FLEXERIL) 10 MG tablet Take 1 tablet by mouth 2 (Two) Times a Day As  "Needed for Muscle Spasms.    Provider, MD Rodrigo   diclofenac (VOLTAREN) 75 MG EC tablet Take 1 tablet by mouth 2 (Two) Times a Day.  Patient not taking: Reported on 3/18/2024 3/8/24   Dre Monahan MD   diclofenac (VOLTAREN) 75 MG EC tablet Take 1 tablet by mouth 2 (Two) Times a Day. 3/18/24   Dre Monahan MD   nicotine (Nicoderm CQ) 7 MG/24HR patch Place 1 patch on the skin as directed by provider Daily.  Patient not taking: Reported on 3/18/2024 1/22/24   Ascencion Tay APRN   phentermine 37.5 MG capsule Take 1 capsule by mouth Every Morning.  Patient not taking: Reported on 1/22/2024 3/27/23   Ascencion Tay APRN   Semaglutide-Weight Management (Wegovy) 0.25 MG/0.5ML solution auto-injector Inject 0.25 mg under the skin into the appropriate area as directed 1 (One) Time Per Week.  Patient not taking: Reported on 3/18/2024 1/22/24   Ascencion Tay APRN        Social History:   Social History     Tobacco Use    Smoking status: Former    Smokeless tobacco: Never    Tobacco comments:     < 0.5 PPD, 15 YEARS   Vaping Use    Vaping status: Never Used   Substance Use Topics    Alcohol use: Never    Drug use: Never         Review of Systems:  Review of Systems   Constitutional:  Negative for chills and fever.   HENT:  Negative for ear pain.    Eyes:  Negative for pain.   Respiratory:  Negative for cough and shortness of breath.    Cardiovascular:  Negative for chest pain.   Gastrointestinal:  Negative for abdominal pain, diarrhea, nausea and vomiting.   Genitourinary:  Negative for dysuria.   Musculoskeletal:  Positive for back pain. Negative for arthralgias.   Skin:  Negative for rash.   Neurological:  Negative for headaches.        Physical Exam:  /98 (BP Location: Left arm, Patient Position: Sitting)   Pulse 66   Temp 97.3 °F (36.3 °C) (Oral)   Resp 18   Ht 160 cm (63\")   Wt 132 kg (291 lb 7.2 oz)   SpO2 100%   BMI 51.63 kg/m²     Physical Exam  Vitals and nursing note reviewed. "   Constitutional:       Appearance: Normal appearance.   HENT:      Head: Normocephalic and atraumatic.      Nose: Nose normal.   Eyes:      Extraocular Movements: Extraocular movements intact.      Conjunctiva/sclera: Conjunctivae normal.      Pupils: Pupils are equal, round, and reactive to light.   Cardiovascular:      Rate and Rhythm: Normal rate and regular rhythm.      Heart sounds: Normal heart sounds.   Pulmonary:      Effort: Pulmonary effort is normal.      Breath sounds: Normal breath sounds.   Abdominal:      General: Abdomen is flat.      Palpations: Abdomen is soft.   Musculoskeletal:         General: Normal range of motion.      Cervical back: Normal range of motion and neck supple.      Comments: Lumbar midline tenderness especially around L1 and L2.   Skin:     General: Skin is warm and dry.   Neurological:      General: No focal deficit present.      Mental Status: She is alert and oriented to person, place, and time.   Psychiatric:         Mood and Affect: Mood normal.         Behavior: Behavior normal.                Procedures:  Procedures      Medical Decision Making:      Comorbidities that affect care:    Obesity    External Notes reviewed:    None      The following orders were placed and all results were independently analyzed by me:  Orders Placed This Encounter   Procedures    PT Consult: Eval & Treat Functional Mobility Below Baseline       Medications Given in the Emergency Department:  Medications   ketorolac (TORADOL) injection 30 mg (30 mg Intramuscular Given 4/5/24 1048)   dexAMETHasone sodium phosphate injection 10 mg (10 mg Intramuscular Given 4/5/24 1049)        ED Course:    ED Course as of 04/05/24 1157   Fri Apr 05, 2024   1059 Elaina PT, evaluated the patient and gave her exercises to do at home. [MV]      ED Course User Index  [MV] Cam Mcnally PA       Labs:    Lab Results (last 24 hours)       ** No results found for the last 24 hours. **             Imaging:    No  Radiology Exams Resulted Within Past 24 Hours      Differential Diagnosis and Discussion:    Back Pain: The patient presents with back pain. My differential diagnosis includes but is not limited to acute spinal epidural abscess, acute spinal epidural bleed, cauda equina syndrome, abdominal aortic aneurysm, aortic dissection, kidney stone, pyelonephritis, musculoskeletal back pain, spinal fracture, and osteoarthritis.         MDM  Risk of Complications, Morbidity, and/or Mortality  Presenting problems: moderate  Diagnostic procedures: low  Management options: low    Patient Progress  Patient progress: stable       Patient presents to the emergency department for evaluation of low back pain that started a few days ago.  States that she was in a flight recently and was hunched forward for about an hour. When she woke up, she started having back pain. Denies any radiating symptoms to the bilateral lower extremities.  Denies any previous back injuries.  Denies any recent falls or traumas.  Denies any loss of bowel or bladder functions.  Denies sinus anesthesia. She has been taking tylenol and advil with no relief. She was recently started on flexeril but it has not helped as well.     Start the patient on Decadron and Toradol.    Elaina, PT, evaluated the patient and gave her exercises to do at home.    On re-eval, patient states that pain is a little bit more tolerable. Will dc patient with toradol.    Will dc the patient with prednisone. I discussed with the patient that this can cause her to gain weight, insomnia, flushing, and can raise her sugar. We came to an agreement that I'll send her a prescription for prednisone but can wait about 3 days to see if her back pain resolves. If her back pain is not any better after three days, she can take her prednisone.    Follow up with PCP in 5-7 days especially if symptoms persist. May need a referral to physical therapist.            Patient Care  Considerations:    NARCOTICS: I considered prescribing opiate pain medication as an outpatient, however there are no bony injuries.      Consultants/Shared Management Plan:    None    Social Determinants of Health:    Patient is independent, reliable, and has access to care.       Disposition and Care Coordination:    Discharged: The patient is suitable and stable for discharge with no need for consideration of admission.    I have explained the patient´s condition, diagnoses and treatment plan based on the information available to me at this time. I have answered questions and addressed any concerns. The patient has a good  understanding of the patient´s diagnosis, condition, and treatment plan as can be expected at this point. The vital signs have been stable. The patient´s condition is stable and appropriate for discharge from the emergency department.      The patient will pursue further outpatient evaluation with the primary care physician or other designated or consulting physician as outlined in the discharge instructions. They are agreeable to this plan of care and follow-up instructions have been explained in detail. The patient has received these instructions in written format and has expressed an understanding of the discharge instructions. The patient is aware that any significant change in condition or worsening of symptoms should prompt an immediate return to this or the closest emergency department or call to 911.  I have explained discharge medications and the need for follow up with the patient/caretakers. This was also printed in the discharge instructions. Patient was discharged with the following medications and follow up:      Medication List        New Prescriptions      ketorolac 10 MG tablet  Commonly known as: TORADOL  Take 1 tablet by mouth Every 6 (Six) Hours As Needed for Moderate Pain.     predniSONE 20 MG tablet  Commonly known as: DELTASONE  Take 1 tablet by mouth 2 (Two) Times a Day  for 5 days.               Where to Get Your Medications        These medications were sent to [facility]  [address] [address]      Hours: Monday to Friday 9 AM to 7:30 PM, Saturday 9 AM to 2 PM Phone: [phone]   ketorolac 10 MG tablet  predniSONE 20 MG tablet      No follow-up provider specified.     Final diagnoses:   Acute midline low back pain without sciatica        ED Disposition       ED Disposition   Discharge    Condition   Stable    Comment   --               This medical record created using voice recognition software.             Cam Mcnally PA  04/05/24 1150

## 2024-04-24 ENCOUNTER — OFFICE VISIT (OUTPATIENT)
Dept: FAMILY MEDICINE CLINIC | Facility: CLINIC | Age: 44
End: 2024-04-24
Payer: COMMERCIAL

## 2024-04-24 VITALS
SYSTOLIC BLOOD PRESSURE: 130 MMHG | DIASTOLIC BLOOD PRESSURE: 90 MMHG | OXYGEN SATURATION: 97 % | RESPIRATION RATE: 16 BRPM | WEIGHT: 291 LBS | HEART RATE: 93 BPM | HEIGHT: 63 IN | TEMPERATURE: 96.9 F | BODY MASS INDEX: 51.56 KG/M2

## 2024-04-24 DIAGNOSIS — Z11.59 NEED FOR HEPATITIS C SCREENING TEST: ICD-10-CM

## 2024-04-24 DIAGNOSIS — Z00.00 ANNUAL PHYSICAL EXAM: Primary | ICD-10-CM

## 2024-04-24 DIAGNOSIS — E66.1 CLASS 3 DRUG-INDUCED OBESITY WITH BODY MASS INDEX (BMI) OF 45.0 TO 49.9 IN ADULT, UNSPECIFIED WHETHER SERIOUS COMORBIDITY PRESENT: ICD-10-CM

## 2024-04-24 DIAGNOSIS — R53.83 FATIGUE, UNSPECIFIED TYPE: ICD-10-CM

## 2024-04-24 DIAGNOSIS — J40 BRONCHITIS: ICD-10-CM

## 2024-04-24 PROCEDURE — 99396 PREV VISIT EST AGE 40-64: CPT | Performed by: NURSE PRACTITIONER

## 2024-04-24 RX ORDER — AZITHROMYCIN 250 MG/1
TABLET, FILM COATED ORAL
Qty: 6 TABLET | Refills: 0 | Status: SHIPPED | OUTPATIENT
Start: 2024-04-24

## 2024-04-24 RX ORDER — SEMAGLUTIDE 0.5 MG/.5ML
0.5 INJECTION, SOLUTION SUBCUTANEOUS WEEKLY
Qty: 2 ML | Refills: 0 | Status: SHIPPED | OUTPATIENT
Start: 2024-04-24

## 2024-04-24 RX ORDER — SEMAGLUTIDE 1 MG/.5ML
1 INJECTION, SOLUTION SUBCUTANEOUS WEEKLY
Qty: 2 ML | Refills: 0 | Status: SHIPPED | OUTPATIENT
Start: 2024-04-24

## 2024-04-24 NOTE — PROGRESS NOTES
Chief Complaint  Obesity, Cough, Annual Exam, and Fatigue    Subjective        Catherine Reyes presents to [facility]  History of Present Illness  Annual exam:  has lost 9 pounds in the last month and is doing well without issues.  Just got in stock.    Cough for 3-4 days and is taking tessalon perles.  Not productive. But tight.  Has been wheezing.    Fatigue:  has had for a long time.  Obesity  Associated symptoms include coughing and fatigue. Pertinent negatives include no abdominal pain, chest pain, fever, numbness or weakness.   Cough  Pertinent negatives include no chest pain, fever or weight loss.   Fatigue  Associated symptoms include coughing and fatigue. Pertinent negatives include no abdominal pain, chest pain, fever, numbness or weakness.   Back Pain  The current episode started 1 to 4 weeks ago. The problem occurs constantly. The problem has been improving since onset. The pain is present in the sacro-iliac. The quality of the pain is described as shooting. The pain does not radiate. The pain is at a severity of 9/10. The pain is The same all the time. The symptoms are aggravated by position. Stiffness is present In the morning. Associated symptoms include tingling. Pertinent negatives include no abdominal pain, bladder incontinence, bowel incontinence, chest pain, dysuria, fever, leg pain, numbness, paresthesias, pelvic pain, perianal numbness, weakness or weight loss. Risk factors include lack of exercise and obesity.   Additional comments: Pain is much better      The following portions of the patient's history were personally reviewed and updated as appropriate: allergies, current medications, past medical history, past surgical history, past family history, and past social history.     Body mass index is 51.56 kg/m².           Past History:    Medical History: has a past medical history of Ankle swelling, Hyperlipidemia, Kidney stones, Left knee pain, Migraines, and  Obesity (I).     Surgical History: has a past surgical history that includes  section; Cholecystectomy; Laser ablation; and Endometrial ablation.     Family History: family history includes Arthritis in her mother; Cancer in her brother; Diabetes in her father and another family member; Heart disease in her father, maternal grandfather, and another family member; Hyperlipidemia in her father and mother; Hypertension in an other family member; Kidney disease in her mother; Stroke in her mother.     Social History: reports that she has quit smoking. She has never used smokeless tobacco. She reports that she does not drink alcohol and does not use drugs.    Allergies: Codeine          Current Outpatient Medications:     atorvastatin (LIPITOR) 20 MG tablet, Take 1 tablet by mouth Daily. (Patient not taking: Reported on 3/18/2024), Disp: 90 tablet, Rfl: 3    azithromycin (Zithromax Z-Kennedy) 250 MG tablet, Take 2 tablets by mouth on day 1, then 1 tablet daily on days 2-5, Disp: 6 tablet, Rfl: 0    cyclobenzaprine (FLEXERIL) 10 MG tablet, Take 1 tablet by mouth 2 (Two) Times a Day As Needed for Muscle Spasms. (Patient not taking: Reported on 2024), Disp: , Rfl:     diclofenac (VOLTAREN) 75 MG EC tablet, Take 1 tablet by mouth 2 (Two) Times a Day. (Patient not taking: Reported on 3/18/2024), Disp: 60 tablet, Rfl: 1    diclofenac (VOLTAREN) 75 MG EC tablet, Take 1 tablet by mouth 2 (Two) Times a Day. (Patient not taking: Reported on 2024), Disp: 60 tablet, Rfl: 1    ketorolac (TORADOL) 10 MG tablet, Take 1 tablet by mouth Every 6 (Six) Hours As Needed for Moderate Pain. (Patient not taking: Reported on 2024), Disp: 15 tablet, Rfl: 0    nicotine (Nicoderm CQ) 7 MG/24HR patch, Place 1 patch on the skin as directed by provider Daily. (Patient not taking: Reported on 3/18/2024), Disp: 28 each, Rfl: 0    phentermine 37.5 MG capsule, Take 1 capsule by mouth Every Morning. (Patient not taking: Reported on  "1/22/2024), Disp: 30 capsule, Rfl: 0    Semaglutide-Weight Management (Wegovy) 0.5 MG/0.5ML solution auto-injector, Inject 0.5 mL under the skin into the appropriate area as directed 1 (One) Time Per Week., Disp: 2 mL, Rfl: 0    Semaglutide-Weight Management (Wegovy) 1 MG/0.5ML solution auto-injector, Inject 0.5 mL under the skin into the appropriate area as directed 1 (One) Time Per Week., Disp: 2 mL, Rfl: 0    Semaglutide-Weight Management 1.7 MG/0.75ML solution auto-injector, Inject 0.75 mL under the skin into the appropriate area as directed 1 (One) Time Per Week., Disp: 3 mL, Rfl: 0    Medications Discontinued During This Encounter   Medication Reason    Semaglutide-Weight Management (Wegovy) 0.25 MG/0.5ML solution auto-injector          Review of Systems   Constitutional:  Positive for fatigue. Negative for fever and unexpected weight loss.   Respiratory:  Positive for cough.    Cardiovascular:  Negative for chest pain.   Gastrointestinal:  Negative for abdominal pain and bowel incontinence.   Genitourinary:  Negative for dysuria, pelvic pain and urinary incontinence.   Musculoskeletal:  Positive for back pain.   Neurological:  Positive for tingling. Negative for weakness, numbness and paresthesias.        Objective         Vitals:    04/24/24 0749   BP: 130/90   BP Location: Right arm   Patient Position: Sitting   Cuff Size: Large Adult   Pulse: 93   Resp: 16   Temp: 96.9 °F (36.1 °C)   TempSrc: Temporal   SpO2: 97%   Weight: 132 kg (291 lb)   Height: 160 cm (62.99\")     Body mass index is 51.56 kg/m².         Physical Exam  Vitals reviewed.   Constitutional:       Appearance: Normal appearance. She is well-developed.   HENT:      Head: Normocephalic and atraumatic.      Mouth/Throat:      Pharynx: No oropharyngeal exudate.   Eyes:      Conjunctiva/sclera: Conjunctivae normal.      Pupils: Pupils are equal, round, and reactive to light.   Cardiovascular:      Rate and Rhythm: Normal rate and regular rhythm. "      Heart sounds: Normal heart sounds. No murmur heard.     No friction rub. No gallop.   Pulmonary:      Effort: Pulmonary effort is normal.      Breath sounds: Normal breath sounds. No wheezing or rhonchi.   Skin:     General: Skin is warm and dry.   Neurological:      Mental Status: She is alert and oriented to person, place, and time.   Psychiatric:         Mood and Affect: Mood and affect normal.         Behavior: Behavior normal.         Thought Content: Thought content normal.         Judgment: Judgment normal.             Result Review :               Assessment and Plan     Diagnoses and all orders for this visit:    1. Annual physical exam (Primary)  Comments:  discussed diet and exercise.  Orders:  -     Comprehensive Metabolic Panel; Future  -     Lipid Panel With / Chol / HDL Ratio; Future  -     Urinalysis With Culture If Indicated -; Future  -     CBC & Differential; Future  -     TSH Rfx On Abnormal To Free T4; Future    2. Need for hepatitis C screening test  -     Hepatitis C antibody; Future    3. 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  Dispense: 2 mL; Refill: 0  -     Semaglutide-Weight Management (Wegovy) 1 MG/0.5ML solution auto-injector; Inject 0.5 mL under the skin into the appropriate area as directed 1 (One) Time Per Week.  Dispense: 2 mL; Refill: 0  -     Semaglutide-Weight Management 1.7 MG/0.75ML solution auto-injector; Inject 0.75 mL under the skin into the appropriate area as directed 1 (One) Time Per Week.  Dispense: 3 mL; Refill: 0    4. Fatigue, unspecified type  -     Ferritin; Future  -     Folate; Future  -     Vitamin B12; Future    5. Bronchitis  -     azithromycin (Zithromax Z-Kennedy) 250 MG tablet; Take 2 tablets by  mouth on day 1, then 1 tablet daily on days 2-5  Dispense: 6 tablet; Refill: 0              Follow Up     Return in about 3 months (around 7/24/2024).    Patient was given instructions and counseling regarding her condition or for health maintenance advice. Please see specific information pulled into the AVS if appropriate.         Answers submitted by the patient for this visit:  Primary Reason for Visit (Submitted on 4/17/2024)  What is the primary reason for your visit?: Back Pain

## 2024-05-10 ENCOUNTER — LAB (OUTPATIENT)
Dept: LAB | Facility: HOSPITAL | Age: 44
End: 2024-05-10
Payer: COMMERCIAL

## 2024-05-10 DIAGNOSIS — Z00.00 ANNUAL PHYSICAL EXAM: ICD-10-CM

## 2024-05-10 DIAGNOSIS — Z11.59 NEED FOR HEPATITIS C SCREENING TEST: ICD-10-CM

## 2024-05-10 DIAGNOSIS — R53.83 FATIGUE, UNSPECIFIED TYPE: ICD-10-CM

## 2024-05-10 LAB
ALBUMIN SERPL-MCNC: 3.7 G/DL (ref 3.5–5.2)
ALBUMIN/GLOB SERPL: 1.3 G/DL
ALP SERPL-CCNC: 145 U/L (ref 39–117)
ALT SERPL W P-5'-P-CCNC: 22 U/L (ref 1–33)
ANION GAP SERPL CALCULATED.3IONS-SCNC: 10.2 MMOL/L (ref 5–15)
AST SERPL-CCNC: 16 U/L (ref 1–32)
BACTERIA UR QL AUTO: NORMAL /HPF
BASOPHILS # BLD AUTO: 0.06 10*3/MM3 (ref 0–0.2)
BASOPHILS NFR BLD AUTO: 0.9 % (ref 0–1.5)
BILIRUB SERPL-MCNC: 0.3 MG/DL (ref 0–1.2)
BILIRUB UR QL STRIP: NEGATIVE
BUN SERPL-MCNC: 15 MG/DL (ref 6–20)
BUN/CREAT SERPL: 20 (ref 7–25)
CALCIUM SPEC-SCNC: 9.1 MG/DL (ref 8.6–10.5)
CHLORIDE SERPL-SCNC: 104 MMOL/L (ref 98–107)
CHOLEST SERPL-MCNC: 198 MG/DL (ref 0–200)
CLARITY UR: CLEAR
CO2 SERPL-SCNC: 23.8 MMOL/L (ref 22–29)
COLOR UR: YELLOW
CREAT SERPL-MCNC: 0.75 MG/DL (ref 0.57–1)
DEPRECATED RDW RBC AUTO: 43.2 FL (ref 37–54)
EGFRCR SERPLBLD CKD-EPI 2021: 100.2 ML/MIN/1.73
EOSINOPHIL # BLD AUTO: 0.2 10*3/MM3 (ref 0–0.4)
EOSINOPHIL NFR BLD AUTO: 3.1 % (ref 0.3–6.2)
ERYTHROCYTE [DISTWIDTH] IN BLOOD BY AUTOMATED COUNT: 13.6 % (ref 12.3–15.4)
FERRITIN SERPL-MCNC: 98.8 NG/ML (ref 13–150)
FOLATE SERPL-MCNC: 8.67 NG/ML (ref 4.78–24.2)
GLOBULIN UR ELPH-MCNC: 2.8 GM/DL
GLUCOSE SERPL-MCNC: 103 MG/DL (ref 65–99)
GLUCOSE UR STRIP-MCNC: NEGATIVE MG/DL
HCT VFR BLD AUTO: 41.6 % (ref 34–46.6)
HCV AB SER QL: NORMAL
HDLC SERPL QL: 4.13
HDLC SERPL-MCNC: 48 MG/DL (ref 40–60)
HGB BLD-MCNC: 13.5 G/DL (ref 12–15.9)
HGB UR QL STRIP.AUTO: ABNORMAL
HOLD SPECIMEN: NORMAL
HYALINE CASTS UR QL AUTO: NORMAL /LPF
IMM GRANULOCYTES # BLD AUTO: 0.02 10*3/MM3 (ref 0–0.05)
IMM GRANULOCYTES NFR BLD AUTO: 0.3 % (ref 0–0.5)
KETONES UR QL STRIP: NEGATIVE
LDLC SERPL CALC-MCNC: 127 MG/DL (ref 0–100)
LEUKOCYTE ESTERASE UR QL STRIP.AUTO: NEGATIVE
LYMPHOCYTES # BLD AUTO: 1.17 10*3/MM3 (ref 0.7–3.1)
LYMPHOCYTES NFR BLD AUTO: 18.4 % (ref 19.6–45.3)
MCH RBC QN AUTO: 28.1 PG (ref 26.6–33)
MCHC RBC AUTO-ENTMCNC: 32.5 G/DL (ref 31.5–35.7)
MCV RBC AUTO: 86.5 FL (ref 79–97)
MONOCYTES # BLD AUTO: 0.43 10*3/MM3 (ref 0.1–0.9)
MONOCYTES NFR BLD AUTO: 6.8 % (ref 5–12)
NEUTROPHILS NFR BLD AUTO: 4.49 10*3/MM3 (ref 1.7–7)
NEUTROPHILS NFR BLD AUTO: 70.5 % (ref 42.7–76)
NITRITE UR QL STRIP: NEGATIVE
NRBC BLD AUTO-RTO: 0 /100 WBC (ref 0–0.2)
PH UR STRIP.AUTO: 7.5 [PH] (ref 5–8)
PLATELET # BLD AUTO: 317 10*3/MM3 (ref 140–450)
PMV BLD AUTO: 9.3 FL (ref 6–12)
POTASSIUM SERPL-SCNC: 4.4 MMOL/L (ref 3.5–5.2)
PROT SERPL-MCNC: 6.5 G/DL (ref 6–8.5)
PROT UR QL STRIP: NEGATIVE
RBC # BLD AUTO: 4.81 10*6/MM3 (ref 3.77–5.28)
RBC # UR STRIP: NORMAL /HPF
REF LAB TEST METHOD: NORMAL
SODIUM SERPL-SCNC: 138 MMOL/L (ref 136–145)
SP GR UR STRIP: 1.01 (ref 1–1.03)
SQUAMOUS #/AREA URNS HPF: NORMAL /HPF
TRIGL SERPL-MCNC: 127 MG/DL (ref 0–150)
TSH SERPL DL<=0.05 MIU/L-ACNC: 3.59 UIU/ML (ref 0.27–4.2)
UROBILINOGEN UR QL STRIP: ABNORMAL
VIT B12 BLD-MCNC: 358 PG/ML (ref 211–946)
VLDLC SERPL-MCNC: 23 MG/DL (ref 5–40)
WBC # UR STRIP: NORMAL /HPF
WBC NRBC COR # BLD AUTO: 6.37 10*3/MM3 (ref 3.4–10.8)

## 2024-05-10 PROCEDURE — 36415 COLL VENOUS BLD VENIPUNCTURE: CPT

## 2024-05-10 PROCEDURE — 80061 LIPID PANEL: CPT

## 2024-05-10 PROCEDURE — 82607 VITAMIN B-12: CPT

## 2024-05-10 PROCEDURE — 82746 ASSAY OF FOLIC ACID SERUM: CPT

## 2024-05-10 PROCEDURE — 81001 URINALYSIS AUTO W/SCOPE: CPT

## 2024-05-10 PROCEDURE — 82728 ASSAY OF FERRITIN: CPT

## 2024-05-10 PROCEDURE — 80050 GENERAL HEALTH PANEL: CPT

## 2024-05-10 PROCEDURE — 86803 HEPATITIS C AB TEST: CPT

## 2024-09-04 ENCOUNTER — PATIENT MESSAGE (OUTPATIENT)
Dept: FAMILY MEDICINE CLINIC | Facility: CLINIC | Age: 44
End: 2024-09-04
Payer: COMMERCIAL

## 2024-09-04 DIAGNOSIS — R05.1 ACUTE COUGH: Primary | ICD-10-CM

## 2024-09-04 NOTE — TELEPHONE ENCOUNTER
From: Cahterine Reyes  To: Ascencion Tay  Sent: 9/4/2024 8:34 AM EDT  Subject: Coughing     Good afternoon I have had an annoying cough for about 3 weeks now no other symptoms just wondering if you could place a chest x ray for me to be done giving my smoking history or just to make sure I don’t have anything else going on.       Thanks Catherine

## 2024-09-05 ENCOUNTER — HOSPITAL ENCOUNTER (OUTPATIENT)
Dept: GENERAL RADIOLOGY | Facility: HOSPITAL | Age: 44
Discharge: HOME | End: 2024-09-05
Admitting: NURSE PRACTITIONER
Payer: COMMERCIAL

## 2024-09-05 DIAGNOSIS — R05.1 ACUTE COUGH: ICD-10-CM

## 2024-09-05 PROCEDURE — 71046 X-RAY EXAM CHEST 2 VIEWS: CPT

## 2024-09-06 DIAGNOSIS — R91.1 PULMONARY NODULE: Primary | ICD-10-CM

## 2024-11-04 ENCOUNTER — TRANSCRIBE ORDERS (OUTPATIENT)
Dept: ADMINISTRATIVE | Facility: HOSPITAL | Age: 44
End: 2024-11-04
Payer: COMMERCIAL

## 2024-11-04 DIAGNOSIS — N92.4 PREMENOPAUSAL MENORRHAGIA: Primary | ICD-10-CM

## 2024-11-05 ENCOUNTER — PATIENT MESSAGE (OUTPATIENT)
Dept: FAMILY MEDICINE CLINIC | Facility: CLINIC | Age: 44
End: 2024-11-05
Payer: COMMERCIAL

## 2024-11-08 ENCOUNTER — TRANSCRIBE ORDERS (OUTPATIENT)
Dept: LAB | Facility: HOSPITAL | Age: 44
End: 2024-11-08
Payer: COMMERCIAL

## 2024-11-08 ENCOUNTER — LAB (OUTPATIENT)
Dept: LAB | Facility: HOSPITAL | Age: 44
End: 2024-11-08
Payer: COMMERCIAL

## 2024-11-08 DIAGNOSIS — N93.8 DUB (DYSFUNCTIONAL UTERINE BLEEDING): ICD-10-CM

## 2024-11-08 DIAGNOSIS — N92.1 MENORRHAGIA WITH IRREGULAR CYCLE: Primary | ICD-10-CM

## 2024-11-08 DIAGNOSIS — N92.1 MENORRHAGIA WITH IRREGULAR CYCLE: ICD-10-CM

## 2024-11-08 LAB
BASOPHILS # BLD AUTO: 0.04 10*3/MM3 (ref 0–0.2)
BASOPHILS NFR BLD AUTO: 0.4 % (ref 0–1.5)
DEPRECATED RDW RBC AUTO: 41.6 FL (ref 37–54)
EOSINOPHIL # BLD AUTO: 0.35 10*3/MM3 (ref 0–0.4)
EOSINOPHIL NFR BLD AUTO: 3.3 % (ref 0.3–6.2)
ERYTHROCYTE [DISTWIDTH] IN BLOOD BY AUTOMATED COUNT: 13.6 % (ref 12.3–15.4)
HBA1C MFR BLD: 6.1 % (ref 4.8–5.6)
HCT VFR BLD AUTO: 41 % (ref 34–46.6)
HGB BLD-MCNC: 13.8 G/DL (ref 12–15.9)
IMM GRANULOCYTES # BLD AUTO: 0.05 10*3/MM3 (ref 0–0.05)
IMM GRANULOCYTES NFR BLD AUTO: 0.5 % (ref 0–0.5)
IRON 24H UR-MRATE: 56 MCG/DL (ref 37–145)
IRON SATN MFR SERPL: 16 % (ref 20–50)
LYMPHOCYTES # BLD AUTO: 1.8 10*3/MM3 (ref 0.7–3.1)
LYMPHOCYTES NFR BLD AUTO: 17 % (ref 19.6–45.3)
MCH RBC QN AUTO: 28.6 PG (ref 26.6–33)
MCHC RBC AUTO-ENTMCNC: 33.7 G/DL (ref 31.5–35.7)
MCV RBC AUTO: 84.9 FL (ref 79–97)
MONOCYTES # BLD AUTO: 0.59 10*3/MM3 (ref 0.1–0.9)
MONOCYTES NFR BLD AUTO: 5.6 % (ref 5–12)
NEUTROPHILS NFR BLD AUTO: 7.73 10*3/MM3 (ref 1.7–7)
NEUTROPHILS NFR BLD AUTO: 73.2 % (ref 42.7–76)
NRBC BLD AUTO-RTO: 0 /100 WBC (ref 0–0.2)
PLATELET # BLD AUTO: 310 10*3/MM3 (ref 140–450)
PMV BLD AUTO: 9.9 FL (ref 6–12)
RBC # BLD AUTO: 4.83 10*6/MM3 (ref 3.77–5.28)
T4 FREE SERPL-MCNC: 1.13 NG/DL (ref 0.92–1.68)
TIBC SERPL-MCNC: 359 MCG/DL (ref 298–536)
TRANSFERRIN SERPL-MCNC: 241 MG/DL (ref 200–360)
TSH SERPL DL<=0.05 MIU/L-ACNC: 3.68 UIU/ML (ref 0.27–4.2)
WBC NRBC COR # BLD AUTO: 10.56 10*3/MM3 (ref 3.4–10.8)

## 2024-11-08 PROCEDURE — 84439 ASSAY OF FREE THYROXINE: CPT

## 2024-11-08 PROCEDURE — 83540 ASSAY OF IRON: CPT

## 2024-11-08 PROCEDURE — 84443 ASSAY THYROID STIM HORMONE: CPT

## 2024-11-08 PROCEDURE — 84466 ASSAY OF TRANSFERRIN: CPT

## 2024-11-08 PROCEDURE — 83036 HEMOGLOBIN GLYCOSYLATED A1C: CPT

## 2024-11-08 PROCEDURE — 85025 COMPLETE CBC W/AUTO DIFF WBC: CPT

## 2024-11-08 PROCEDURE — 36415 COLL VENOUS BLD VENIPUNCTURE: CPT

## 2024-11-12 ENCOUNTER — HOSPITAL ENCOUNTER (OUTPATIENT)
Dept: ULTRASOUND IMAGING | Facility: HOSPITAL | Age: 44
Discharge: HOME | End: 2024-11-12
Admitting: OBSTETRICS & GYNECOLOGY
Payer: COMMERCIAL

## 2024-11-12 DIAGNOSIS — N92.4 PREMENOPAUSAL MENORRHAGIA: ICD-10-CM

## 2024-11-12 PROCEDURE — 76830 TRANSVAGINAL US NON-OB: CPT

## 2024-11-18 ENCOUNTER — TRANSCRIBE ORDERS (OUTPATIENT)
Dept: ADMINISTRATIVE | Facility: HOSPITAL | Age: 44
End: 2024-11-18
Payer: COMMERCIAL

## 2024-11-18 DIAGNOSIS — D25.9 UTERINE LEIOMYOMA, UNSPECIFIED LOCATION: ICD-10-CM

## 2024-11-18 DIAGNOSIS — N83.292 OTHER OVARIAN CYST, LEFT SIDE: Primary | ICD-10-CM

## 2024-11-22 ENCOUNTER — OFFICE VISIT (OUTPATIENT)
Dept: FAMILY MEDICINE CLINIC | Facility: CLINIC | Age: 44
End: 2024-11-22
Payer: COMMERCIAL

## 2024-11-22 VITALS
WEIGHT: 293 LBS | OXYGEN SATURATION: 99 % | TEMPERATURE: 97.2 F | BODY MASS INDEX: 54.4 KG/M2 | SYSTOLIC BLOOD PRESSURE: 140 MMHG | HEART RATE: 94 BPM | RESPIRATION RATE: 18 BRPM | DIASTOLIC BLOOD PRESSURE: 98 MMHG

## 2024-11-22 DIAGNOSIS — M17.11 OSTEOARTHRITIS OF RIGHT KNEE, UNSPECIFIED OSTEOARTHRITIS TYPE: ICD-10-CM

## 2024-11-22 DIAGNOSIS — E66.1 CLASS 3 DRUG-INDUCED OBESITY WITH BODY MASS INDEX (BMI) OF 50.0 TO 59.9 IN ADULT, UNSPECIFIED WHETHER SERIOUS COMORBIDITY PRESENT: Primary | ICD-10-CM

## 2024-11-22 DIAGNOSIS — E66.813 CLASS 3 DRUG-INDUCED OBESITY WITH BODY MASS INDEX (BMI) OF 50.0 TO 59.9 IN ADULT, UNSPECIFIED WHETHER SERIOUS COMORBIDITY PRESENT: Primary | ICD-10-CM

## 2024-11-22 PROCEDURE — 99214 OFFICE O/P EST MOD 30 MIN: CPT | Performed by: NURSE PRACTITIONER

## 2024-11-22 RX ORDER — PROGESTERONE 100 MG/1
1 CAPSULE ORAL DAILY
COMMUNITY
Start: 2024-11-18

## 2024-11-22 RX ORDER — SEMAGLUTIDE 0.25 MG/.5ML
0.25 INJECTION, SOLUTION SUBCUTANEOUS WEEKLY
Qty: 2 ML | Refills: 0 | Status: SHIPPED | OUTPATIENT
Start: 2024-11-22

## 2024-11-22 NOTE — PROGRESS NOTES
Chief Complaint  Obesity, Fatigue, Abnormal Pap Smear (Polyps - Dr. Florence - called office to get report sent over, office closed today), and Anemia    Subjective        Catherine Reyes presents to [facility]  History of Present Illness   Obesity Is interested in the weight loss options of GLP-'s for weight loss..  Discussed contraindications of family history of MEN 2 and/or medullary thyroid carcinoma.  Patient denies family or personal history of either.   Encouraged enrolling in weight .    Fatigue moreso than usual.  Had a period for 6 weeks.  Is slowly getting better.  Has been anemic in the past.    Knee pain and states will take diclofenac sporadically.    Symptoms include fatigue.  Pertinent negative symptoms include no chest pain, no cough, no fever, no numbness and no weakness.     Symptoms include fatigue.  Pertinent negative symptoms include no chest pain, no cough, no fever, no numbness and no weakness.   Anemia  There has been no fever or palpitations.       The following portions of the patient's history were personally reviewed and updated as appropriate: allergies, current medications, past medical history, past surgical history, past family history, and past social history.     Body mass index is 54.4 kg/m².    Class 3 Severe Obesity (BMI >=40). Obesity-related health conditions include the following: none. Obesity is unchanged. BMI is is above average; BMI management plan is completed. We discussed portion control and increasing exercise.      Past History:    Medical History: has a past medical history of Ankle swelling, Hyperlipidemia, Kidney stones, Left knee pain, Migraines, and Obesity (I).     Surgical History: has a past surgical history that includes  section; Cholecystectomy; Laser ablation; and Endometrial ablation.     Family History: family history includes Arthritis in her mother; Cancer in her brother; Diabetes in her father and another  family member; Heart disease in her father, maternal grandfather, and another family member; Hyperlipidemia in her father and mother; Hypertension in an other family member; Kidney disease in her mother; Stroke in her mother.     Social History: reports that she has quit smoking. Her smoking use included cigarettes. She has a 15 pack-year smoking history. She has never used smokeless tobacco. She reports that she does not drink alcohol and does not use drugs.    Allergies: Codeine          Current Outpatient Medications:     diclofenac (VOLTAREN) 75 MG EC tablet, Take 1 tablet by mouth 2 (Two) Times a Day., Disp: 60 tablet, Rfl: 1    Progesterone (PROMETRIUM) 100 MG capsule, Take 1 capsule by mouth Daily., Disp: , Rfl:     Semaglutide-Weight Management (Wegovy) 0.25 MG/0.5ML solution auto-injector, Inject 0.5 mL under the skin into the appropriate area as directed 1 (One) Time Per Week., Disp: 2 mL, Rfl: 0    Medications Discontinued During This Encounter   Medication Reason    atorvastatin (LIPITOR) 20 MG tablet *Therapy completed    phentermine 37.5 MG capsule *Therapy completed    nicotine (Nicoderm CQ) 7 MG/24HR patch *Therapy completed    diclofenac (VOLTAREN) 75 MG EC tablet *Therapy completed    cyclobenzaprine (FLEXERIL) 10 MG tablet *Therapy completed    ketorolac (TORADOL) 10 MG tablet *Therapy completed    Semaglutide-Weight Management (Wegovy) 0.5 MG/0.5ML solution auto-injector *Therapy completed    Semaglutide-Weight Management (Wegovy) 1 MG/0.5ML solution auto-injector *Therapy completed    Semaglutide-Weight Management 1.7 MG/0.75ML solution auto-injector *Therapy completed    azithromycin (Zithromax Z-Kennedy) 250 MG tablet *Therapy completed         Review of Systems   Constitutional:  Positive for fatigue. Negative for fever.   Respiratory:  Negative for cough and shortness of breath.    Cardiovascular:  Negative for chest pain, palpitations and leg swelling.   Neurological:  Negative for dizziness,  weakness, numbness and headache.        Objective         Vitals:    11/22/24 0819   BP: 140/98   BP Location: Right arm   Patient Position: Sitting   Cuff Size: Large Adult   Pulse: 94   Resp: 18   Temp: 97.2 °F (36.2 °C)   TempSrc: Temporal   SpO2: 99%   Weight: (!) 139 kg (307 lb)     Body mass index is 54.4 kg/m².         Physical Exam  Vitals reviewed.   Constitutional:       Appearance: Normal appearance. She is well-developed.   HENT:      Head: Normocephalic and atraumatic.      Mouth/Throat:      Pharynx: No oropharyngeal exudate.   Eyes:      Conjunctiva/sclera: Conjunctivae normal.      Pupils: Pupils are equal, round, and reactive to light.   Cardiovascular:      Rate and Rhythm: Normal rate and regular rhythm.      Heart sounds: Normal heart sounds. No murmur heard.     No friction rub. No gallop.   Pulmonary:      Effort: Pulmonary effort is normal.      Breath sounds: Normal breath sounds. No wheezing or rhonchi.   Skin:     General: Skin is warm and dry.   Neurological:      Mental Status: She is alert and oriented to person, place, and time.   Psychiatric:         Mood and Affect: Mood and affect normal.         Behavior: Behavior normal.         Thought Content: Thought content normal.         Judgment: Judgment normal.             Result Review :               Assessment and Plan     Diagnoses and all orders for this visit:    1. Class 3 drug-induced obesity with body mass index (BMI) of 50.0 to 59.9 in adult, unspecified whether serious comorbidity present (Primary)  -     Semaglutide-Weight Management (Wegovy) 0.25 MG/0.5ML solution auto-injector; Inject 0.5 mL under the skin into the appropriate area as directed 1 (One) Time Per Week.  Dispense: 2 mL; Refill: 0  -     Hemoglobin A1c; Future  -     Lipid Panel With / Chol / HDL Ratio; Future  -     Comprehensive Metabolic Panel; Future  -     CBC (No Diff); Future  -     Urinalysis With Culture If Indicated -; Future    2. Osteoarthritis of  right knee, unspecified osteoarthritis type              Follow Up     Return in about 6 months (around 5/22/2025).    Patient was given instructions and counseling regarding her condition or for health maintenance advice. Please see specific information pulled into the AVS if appropriate.

## 2025-08-07 ENCOUNTER — LAB (OUTPATIENT)
Dept: LAB | Facility: HOSPITAL | Age: 45
End: 2025-08-07
Payer: COMMERCIAL

## 2025-08-07 DIAGNOSIS — E66.1 CLASS 3 DRUG-INDUCED OBESITY WITH BODY MASS INDEX (BMI) OF 50.0 TO 59.9 IN ADULT, UNSPECIFIED WHETHER SERIOUS COMORBIDITY PRESENT: ICD-10-CM

## 2025-08-07 DIAGNOSIS — E66.813 CLASS 3 DRUG-INDUCED OBESITY WITH BODY MASS INDEX (BMI) OF 50.0 TO 59.9 IN ADULT, UNSPECIFIED WHETHER SERIOUS COMORBIDITY PRESENT: ICD-10-CM

## 2025-08-07 LAB
ALBUMIN SERPL-MCNC: 3.9 G/DL (ref 3.5–5.2)
ALBUMIN/GLOB SERPL: 1.3 G/DL
ALP SERPL-CCNC: 132 U/L (ref 39–117)
ALT SERPL W P-5'-P-CCNC: 19 U/L (ref 1–33)
ANION GAP SERPL CALCULATED.3IONS-SCNC: 14.1 MMOL/L (ref 5–15)
AST SERPL-CCNC: 17 U/L (ref 1–32)
BILIRUB SERPL-MCNC: 0.2 MG/DL (ref 0–1.2)
BILIRUB UR QL STRIP: NEGATIVE
BUN SERPL-MCNC: 11 MG/DL (ref 6–20)
BUN/CREAT SERPL: 14.9 (ref 7–25)
CALCIUM SPEC-SCNC: 9 MG/DL (ref 8.6–10.5)
CHLORIDE SERPL-SCNC: 103 MMOL/L (ref 98–107)
CHOLEST SERPL-MCNC: 211 MG/DL (ref 0–200)
CLARITY UR: CLEAR
CO2 SERPL-SCNC: 20.9 MMOL/L (ref 22–29)
COLOR UR: YELLOW
CREAT SERPL-MCNC: 0.74 MG/DL (ref 0.57–1)
DEPRECATED RDW RBC AUTO: 44.4 FL (ref 37–54)
EGFRCR SERPLBLD CKD-EPI 2021: 100.6 ML/MIN/1.73
ERYTHROCYTE [DISTWIDTH] IN BLOOD BY AUTOMATED COUNT: 14.2 % (ref 12.3–15.4)
GLOBULIN UR ELPH-MCNC: 3.1 GM/DL
GLUCOSE SERPL-MCNC: 120 MG/DL (ref 65–99)
GLUCOSE UR STRIP-MCNC: NEGATIVE MG/DL
HBA1C MFR BLD: 5.9 % (ref 4.8–5.6)
HCT VFR BLD AUTO: 39.2 % (ref 34–46.6)
HDLC SERPL QL: 3.91
HDLC SERPL-MCNC: 54 MG/DL (ref 40–60)
HGB BLD-MCNC: 12.8 G/DL (ref 12–15.9)
HGB UR QL STRIP.AUTO: NEGATIVE
HOLD SPECIMEN: NORMAL
KETONES UR QL STRIP: NEGATIVE
LDLC SERPL CALC-MCNC: 146 MG/DL (ref 0–100)
LEUKOCYTE ESTERASE UR QL STRIP.AUTO: NEGATIVE
MCH RBC QN AUTO: 28.2 PG (ref 26.6–33)
MCHC RBC AUTO-ENTMCNC: 32.7 G/DL (ref 31.5–35.7)
MCV RBC AUTO: 86.3 FL (ref 79–97)
NITRITE UR QL STRIP: NEGATIVE
PH UR STRIP.AUTO: 7 [PH] (ref 5–8)
PLATELET # BLD AUTO: 356 10*3/MM3 (ref 140–450)
PMV BLD AUTO: 9.8 FL (ref 6–12)
POTASSIUM SERPL-SCNC: 4.4 MMOL/L (ref 3.5–5.2)
PROT SERPL-MCNC: 7 G/DL (ref 6–8.5)
PROT UR QL STRIP: NEGATIVE
RBC # BLD AUTO: 4.54 10*6/MM3 (ref 3.77–5.28)
SODIUM SERPL-SCNC: 138 MMOL/L (ref 136–145)
SP GR UR STRIP: 1.02 (ref 1–1.03)
TRIGL SERPL-MCNC: 61 MG/DL (ref 0–150)
UROBILINOGEN UR QL STRIP: NORMAL
VLDLC SERPL-MCNC: 11 MG/DL (ref 5–40)
WBC NRBC COR # BLD AUTO: 11.85 10*3/MM3 (ref 3.4–10.8)

## 2025-08-07 PROCEDURE — 80053 COMPREHEN METABOLIC PANEL: CPT

## 2025-08-07 PROCEDURE — 36415 COLL VENOUS BLD VENIPUNCTURE: CPT

## 2025-08-07 PROCEDURE — 80061 LIPID PANEL: CPT

## 2025-08-07 PROCEDURE — 85027 COMPLETE CBC AUTOMATED: CPT

## 2025-08-07 PROCEDURE — 83036 HEMOGLOBIN GLYCOSYLATED A1C: CPT

## 2025-08-07 PROCEDURE — 81003 URINALYSIS AUTO W/O SCOPE: CPT

## 2025-08-08 ENCOUNTER — RESULTS FOLLOW-UP (OUTPATIENT)
Dept: FAMILY MEDICINE CLINIC | Facility: CLINIC | Age: 45
End: 2025-08-08
Payer: COMMERCIAL